# Patient Record
Sex: MALE | Race: OTHER | Employment: FULL TIME | URBAN - METROPOLITAN AREA
[De-identification: names, ages, dates, MRNs, and addresses within clinical notes are randomized per-mention and may not be internally consistent; named-entity substitution may affect disease eponyms.]

---

## 2017-07-10 ENCOUNTER — TELEPHONE (OUTPATIENT)
Dept: INTERNAL MEDICINE CLINIC | Facility: CLINIC | Age: 37
End: 2017-07-10

## 2017-07-11 NOTE — TELEPHONE ENCOUNTER
Actions Requested: appt for eval scheduled 7/12/17  Problem: complete body joint pain  Onset and Timing: > 6- 8 weeks  Associated Symptoms: profuse arthralgia, no swelling, afebrile currently did have fever yesterday for 2-3 hours.   TMax 101  Aggravating b

## 2017-07-12 ENCOUNTER — OFFICE VISIT (OUTPATIENT)
Dept: FAMILY MEDICINE CLINIC | Facility: CLINIC | Age: 37
End: 2017-07-12

## 2017-07-12 VITALS
DIASTOLIC BLOOD PRESSURE: 91 MMHG | TEMPERATURE: 98 F | BODY MASS INDEX: 38.36 KG/M2 | SYSTOLIC BLOOD PRESSURE: 135 MMHG | HEART RATE: 79 BPM | HEIGHT: 69 IN | WEIGHT: 259 LBS

## 2017-07-12 DIAGNOSIS — M25.60 JOINT STIFFNESS: ICD-10-CM

## 2017-07-12 DIAGNOSIS — R21 MALAR RASH: ICD-10-CM

## 2017-07-12 DIAGNOSIS — M25.50 POLYARTHRALGIA: Primary | ICD-10-CM

## 2017-07-12 PROCEDURE — 99212 OFFICE O/P EST SF 10 MIN: CPT | Performed by: FAMILY MEDICINE

## 2017-07-12 PROCEDURE — 99214 OFFICE O/P EST MOD 30 MIN: CPT | Performed by: FAMILY MEDICINE

## 2017-07-12 NOTE — PROGRESS NOTES
HPI:    Patient ID: Dionna Cortes is a 40year old male. HPI     Patient here with complains of having generalized joint pains including his shoulders and knees as well as elbows. He has noticed this in the last few months.     He denies any new medicat spanning across his cheeks and slightly over his nose. Psychiatric: He has a normal mood and affect. His behavior is normal.              ASSESSMENT/PLAN:   Polyarthralgia  (primary encounter diagnosis)  Joint stiffness  Malar rash     1.  Polyarthralgia

## 2017-07-19 ENCOUNTER — TELEPHONE (OUTPATIENT)
Dept: FAMILY MEDICINE CLINIC | Facility: CLINIC | Age: 37
End: 2017-07-19

## 2017-07-20 NOTE — TELEPHONE ENCOUNTER
Labs received  Blood sugar elevated  Also liver enzymes elevated and CRP elevated suggesting inflammation  Other labs ok    Need other liver tests  Will order liver panel, hepatitis panel.  Also will order HbA1C    Pls call patient and he gets labs done out

## 2017-07-25 NOTE — TELEPHONE ENCOUNTER
Patient informed of results and recommendations, as per Dr. Nydia Moulton result note. Patient voiced understanding and agrees with recommendations.        Pt states he lives about 5 minutes from [place] so he would just like to  the lab orders tomorrow, to take

## 2017-08-01 NOTE — TELEPHONE ENCOUNTER
LMTCB-please find out if patient picked up his order at [facility].

## 2017-11-17 ENCOUNTER — TELEPHONE (OUTPATIENT)
Dept: FAMILY MEDICINE CLINIC | Facility: CLINIC | Age: 37
End: 2017-11-17

## 2017-11-17 RX ORDER — INDOMETHACIN 50 MG/1
CAPSULE ORAL
Qty: 60 CAPSULE | Refills: 0 | Status: SHIPPED | OUTPATIENT
Start: 2017-11-17 | End: 2017-12-20

## 2017-11-17 NOTE — TELEPHONE ENCOUNTER
Refill Protocol Appointment Criteria Indomethacin Rx refilled as previously prescribed per protocol.   · Appointment scheduled in the past 6 months or in the next 3 months  Recent Outpatient Visits            4 months ago [address],

## 2017-12-20 RX ORDER — INDOMETHACIN 50 MG/1
CAPSULE ORAL
Qty: 60 CAPSULE | Refills: 0 | Status: SHIPPED | OUTPATIENT
Start: 2017-12-20 | End: 2019-03-06 | Stop reason: ALTCHOICE

## 2017-12-20 NOTE — TELEPHONE ENCOUNTER
Pt is leaving for [facility] tomorrow. States would like to have indomethacin on hand in case he has a gout flare up. Please advise.  Rx pended    Please respond to pool: PEDRO LUIS Rodriguez 62 LPN/CMA

## 2017-12-20 NOTE — TELEPHONE ENCOUNTER
[facility]    [place], [place]   Current Outpatient Prescriptions:  indomethacin 50 MG Oral Cap TAKE 1 CAPSULE (50 MG TOTAL) BY MOUTH 3 (THREE) TIMES DAILY WITH MEALS.  Disp: 60 capsule Rfl: 0

## 2019-02-20 ENCOUNTER — OFFICE VISIT (OUTPATIENT)
Dept: FAMILY MEDICINE CLINIC | Facility: CLINIC | Age: 39
End: 2019-02-20
Payer: COMMERCIAL

## 2019-02-20 VITALS
SYSTOLIC BLOOD PRESSURE: 137 MMHG | DIASTOLIC BLOOD PRESSURE: 80 MMHG | TEMPERATURE: 98 F | HEART RATE: 85 BPM | WEIGHT: 243 LBS | BODY MASS INDEX: 35.99 KG/M2 | HEIGHT: 69 IN

## 2019-02-20 DIAGNOSIS — Z00.00 WELL ADULT EXAM: ICD-10-CM

## 2019-02-20 DIAGNOSIS — R74.8 ELEVATED LIVER ENZYMES: ICD-10-CM

## 2019-02-20 DIAGNOSIS — E66.9 OBESITY (BMI 30-39.9): ICD-10-CM

## 2019-02-20 DIAGNOSIS — E79.0 HYPERURICEMIA: ICD-10-CM

## 2019-02-20 DIAGNOSIS — R73.9 HYPERGLYCEMIA: ICD-10-CM

## 2019-02-20 DIAGNOSIS — B37.42 CANDIDAL BALANITIS: Primary | ICD-10-CM

## 2019-02-20 PROCEDURE — 99214 OFFICE O/P EST MOD 30 MIN: CPT | Performed by: FAMILY MEDICINE

## 2019-02-20 PROCEDURE — G0463 HOSPITAL OUTPT CLINIC VISIT: HCPCS | Performed by: FAMILY MEDICINE

## 2019-02-20 RX ORDER — NYSTATIN 100000 U/G
1 CREAM TOPICAL 3 TIMES DAILY
Qty: 1 TUBE | Refills: 1 | Status: SHIPPED | OUTPATIENT
Start: 2019-02-20 | End: 2019-03-06

## 2019-02-20 NOTE — PROGRESS NOTES
HPI:    Patient ID: Sindy Naomi is a 45year old male. HPI  Patient presents with: Other: would like to be tested for STDS c/o of having itchiness on his penis    Patient c/o itching and redness over foreskin and penis shaft  No sores.      and (BMI 30-39.9)    - HEMOGLOBIN A1C; Future    3. Hyperuricemia    - URIC ACID, SERUM; Future    4. Well adult exam  rtc for physical  - BASIC METABOLIC PANEL (8); Future  - CBC WITH DIFFERENTIAL WITH PLATELET; Future  - LIPID PANEL;  Future  - TSH W REFLEX T

## 2019-03-06 ENCOUNTER — OFFICE VISIT (OUTPATIENT)
Dept: FAMILY MEDICINE CLINIC | Facility: CLINIC | Age: 39
End: 2019-03-06
Payer: COMMERCIAL

## 2019-03-06 VITALS
HEIGHT: 69 IN | SYSTOLIC BLOOD PRESSURE: 137 MMHG | DIASTOLIC BLOOD PRESSURE: 95 MMHG | HEART RATE: 76 BPM | BODY MASS INDEX: 36.14 KG/M2 | WEIGHT: 244 LBS | TEMPERATURE: 98 F

## 2019-03-06 DIAGNOSIS — E79.0 HYPERURICEMIA: ICD-10-CM

## 2019-03-06 DIAGNOSIS — R74.8 ELEVATED LIVER ENZYMES: ICD-10-CM

## 2019-03-06 DIAGNOSIS — R21 FACIAL RASH: ICD-10-CM

## 2019-03-06 DIAGNOSIS — M25.562 ACUTE PAIN OF LEFT KNEE: ICD-10-CM

## 2019-03-06 DIAGNOSIS — D22.9 MULTIPLE NEVI: ICD-10-CM

## 2019-03-06 DIAGNOSIS — Z00.00 WELL ADULT EXAM: Primary | ICD-10-CM

## 2019-03-06 DIAGNOSIS — R03.0 ELEVATED BLOOD PRESSURE READING: ICD-10-CM

## 2019-03-06 DIAGNOSIS — M25.512 ACUTE PAIN OF LEFT SHOULDER: ICD-10-CM

## 2019-03-06 PROBLEM — M25.50 POLYARTHRALGIA: Status: RESOLVED | Noted: 2017-07-12 | Resolved: 2019-03-06

## 2019-03-06 PROCEDURE — 99395 PREV VISIT EST AGE 18-39: CPT | Performed by: FAMILY MEDICINE

## 2019-03-06 RX ORDER — METRONIDAZOLE 10 MG/G
1 GEL TOPICAL 2 TIMES DAILY
Qty: 60 G | Refills: 0 | Status: SHIPPED | OUTPATIENT
Start: 2019-03-06 | End: 2019-07-04

## 2019-03-06 NOTE — PROGRESS NOTES
HPI:    Patient ID: Devin Conklin is a 45year old male. 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   Last tdap 2013    Will be getting labs done  Has order surgical history.   Social History    Socioeconomic History      Marital status: Single      Spouse name: Not on file      Number of children: Not on file      Years of education: Not on file      Highest education level: Not on file    Occupational History file    Family History   Problem Relation Age of Onset   • Hypertension Maternal Grandmother    • Heart Disorder Paternal Grandmother         Cardiomyopathy   • Other (Other) Other         lupus, mat aunt       Immunization History   Administered Date(s) A thought content normal.     Declined gu exam           ASSESSMENT/PLAN:   Well adult exam  (primary encounter diagnosis)  Elevated liver enzymes  Hyperuricemia  Acute pain of left knee  Acute pain of left shoulder  Facial rash  Elevated blood pressure read

## 2019-03-13 ENCOUNTER — OFFICE VISIT (OUTPATIENT)
Dept: DERMATOLOGY CLINIC | Facility: CLINIC | Age: 39
End: 2019-03-13
Payer: COMMERCIAL

## 2019-03-13 DIAGNOSIS — D22.9 MULTIPLE NEVI: Primary | ICD-10-CM

## 2019-03-13 DIAGNOSIS — D23.4 BENIGN NEOPLASM OF SCALP AND SKIN OF NECK: ICD-10-CM

## 2019-03-13 DIAGNOSIS — D23.5 BENIGN NEOPLASM OF SKIN OF TRUNK, EXCEPT SCROTUM: ICD-10-CM

## 2019-03-13 DIAGNOSIS — L71.9 ROSACEA: ICD-10-CM

## 2019-03-13 DIAGNOSIS — D23.60 BENIGN NEOPLASM OF SKIN OF UPPER LIMB, INCLUDING SHOULDER, UNSPECIFIED LATERALITY: ICD-10-CM

## 2019-03-13 DIAGNOSIS — D23.30 BENIGN NEOPLASM OF SKIN OF FACE: ICD-10-CM

## 2019-03-13 PROCEDURE — 99212 OFFICE O/P EST SF 10 MIN: CPT | Performed by: DERMATOLOGY

## 2019-03-13 PROCEDURE — 99202 OFFICE O/P NEW SF 15 MIN: CPT | Performed by: DERMATOLOGY

## 2019-03-24 NOTE — PROGRESS NOTES
Tonie Fontana is a 45year old male. HPI:     CC:  Patient presents with:  Rosacea: New pt presenting with rosacea on bilateral cheeks. Pt states currently using metrogel 1-2 times daily.  Pt notes started one week prior and has not seen any improvement ye Beer, 2 beers weekly, last drink last night      Drug use: No      Sexual activity: Not on file    Lifestyle      Physical activity:        Days per week: Not on file        Minutes per session: Not on file      Stress: Not on file    Relationships      So recently been develping. Pt denies personal and family hx of skin cancer. Patient presents with concerns above. Patient has been in their usual state of health. History, medications, allergies reviewed as noted.       ROS:  Denies any other syste the chest arms reassurance. No suspicious lesions. Erythema over the central face and nose few papules. Using MetroGel for the past week mostly once daily. continue metronidazole. .Rosacea. Meds in grid. Skin care instructions reviewed.   Pathophys

## 2020-01-06 ENCOUNTER — OFFICE VISIT (OUTPATIENT)
Dept: FAMILY MEDICINE CLINIC | Facility: CLINIC | Age: 40
End: 2020-01-06
Payer: COMMERCIAL

## 2020-01-06 VITALS
HEART RATE: 69 BPM | WEIGHT: 231 LBS | BODY MASS INDEX: 34.21 KG/M2 | OXYGEN SATURATION: 99 % | SYSTOLIC BLOOD PRESSURE: 149 MMHG | DIASTOLIC BLOOD PRESSURE: 93 MMHG | TEMPERATURE: 99 F | HEIGHT: 69 IN

## 2020-01-06 DIAGNOSIS — R06.2 WHEEZING: ICD-10-CM

## 2020-01-06 DIAGNOSIS — R05.9 COUGH: Primary | ICD-10-CM

## 2020-01-06 DIAGNOSIS — R53.83 FATIGUE, UNSPECIFIED TYPE: ICD-10-CM

## 2020-01-06 DIAGNOSIS — R03.0 ELEVATED BLOOD PRESSURE READING: ICD-10-CM

## 2020-01-06 PROCEDURE — 99212 OFFICE O/P EST SF 10 MIN: CPT | Performed by: FAMILY MEDICINE

## 2020-01-06 PROCEDURE — 99214 OFFICE O/P EST MOD 30 MIN: CPT | Performed by: FAMILY MEDICINE

## 2020-01-06 RX ORDER — AZITHROMYCIN 250 MG/1
TABLET, FILM COATED ORAL
Qty: 6 TABLET | Refills: 0 | Status: SHIPPED | OUTPATIENT
Start: 2020-01-06 | End: 2020-01-27 | Stop reason: ALTCHOICE

## 2020-01-06 NOTE — PROGRESS NOTES
HPI:    Patient ID: Jonah Butterfield is a 44year old male.     HPI  Patient presents with:  ER F/U: went to [facility] last Sunday had the influenza B still coughing, lost of appetite and still feeling kind of weak     Went to ER at [facility] 12/29 and wa (CPT=71046); Future  - azithromycin 250 MG Oral Tab; Take two tablets by mouth today, then one daily. Dispense: 6 tablet; Refill: 0    3. Fatigue, unspecified type  Push fluids    4.  Elevated blood pressure reading  Low salt diet  Follow up 2 weeks      N

## 2020-01-08 ENCOUNTER — HOSPITAL ENCOUNTER (OUTPATIENT)
Dept: GENERAL RADIOLOGY | Age: 40
Discharge: HOME | End: 2020-01-08
Attending: FAMILY MEDICINE
Payer: COMMERCIAL

## 2020-01-08 DIAGNOSIS — R05.9 COUGH: ICD-10-CM

## 2020-01-08 DIAGNOSIS — R06.2 WHEEZING: ICD-10-CM

## 2020-01-08 PROCEDURE — 71046 X-RAY EXAM CHEST 2 VIEWS: CPT | Performed by: FAMILY MEDICINE

## 2020-01-27 ENCOUNTER — OFFICE VISIT (OUTPATIENT)
Dept: FAMILY MEDICINE CLINIC | Facility: CLINIC | Age: 40
End: 2020-01-27
Payer: COMMERCIAL

## 2020-01-27 VITALS
HEART RATE: 73 BPM | DIASTOLIC BLOOD PRESSURE: 95 MMHG | WEIGHT: 236 LBS | TEMPERATURE: 98 F | BODY MASS INDEX: 34.96 KG/M2 | HEIGHT: 69 IN | SYSTOLIC BLOOD PRESSURE: 144 MMHG

## 2020-01-27 DIAGNOSIS — R03.0 ELEVATED BLOOD PRESSURE READING: ICD-10-CM

## 2020-01-27 DIAGNOSIS — R05.9 COUGH: Primary | ICD-10-CM

## 2020-01-27 PROCEDURE — 99213 OFFICE O/P EST LOW 20 MIN: CPT | Performed by: FAMILY MEDICINE

## 2020-01-27 PROCEDURE — 99212 OFFICE O/P EST SF 10 MIN: CPT | Performed by: FAMILY MEDICINE

## 2020-01-27 NOTE — PROGRESS NOTES
HPI:    Patient ID: Lynette Subramanian is a 44year old male. HPI  Patient presents with: Follow - Up: on BP and cough feeling much better     Patient here for follow up  Had flu recently    cxr done recently.  Normal.    Coughing resolved    States he had a

## 2020-01-27 NOTE — PATIENT INSTRUCTIONS
Low-Salt Diet  This diet removes foods that are high in salt. It also limits the amount of salt you use when cooking. It is most often used for people with high blood pressure, edema (fluid retention), and kidney, liver, or heart disease.   Table salt con Avoid: Flavored coffees, electrolyte replacement drinks, sports drinks  Meats  Ok: All fresh meat, fish, poultry, low-salt tuna, eggs, egg substitute  Avoid: Smoked, pickled, brine-cured, or salted meats and fish.  This includes mas, chipped beef, corned

## 2020-07-01 ENCOUNTER — NURSE TRIAGE (OUTPATIENT)
Dept: FAMILY MEDICINE CLINIC | Facility: CLINIC | Age: 40
End: 2020-07-01

## 2020-07-01 NOTE — TELEPHONE ENCOUNTER
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 States the hospital advised his employee to inform anyone he has been in contact with to be tested. Had last contact with pt today. Pt denies symptoms.  Infor

## 2020-07-30 ENCOUNTER — NURSE TRIAGE (OUTPATIENT)
Dept: FAMILY MEDICINE CLINIC | Facility: CLINIC | Age: 40
End: 2020-07-30

## 2020-07-30 ENCOUNTER — OFFICE VISIT (OUTPATIENT)
Dept: FAMILY MEDICINE CLINIC | Facility: CLINIC | Age: 40
End: 2020-07-30
Payer: COMMERCIAL

## 2020-07-30 VITALS
HEIGHT: 69 IN | DIASTOLIC BLOOD PRESSURE: 95 MMHG | TEMPERATURE: 98 F | HEART RATE: 95 BPM | BODY MASS INDEX: 33.47 KG/M2 | WEIGHT: 226 LBS | SYSTOLIC BLOOD PRESSURE: 161 MMHG

## 2020-07-30 DIAGNOSIS — M10.9 ACUTE GOUT OF RIGHT ANKLE, UNSPECIFIED CAUSE: Primary | ICD-10-CM

## 2020-07-30 DIAGNOSIS — I10 ESSENTIAL HYPERTENSION: ICD-10-CM

## 2020-07-30 PROCEDURE — 99214 OFFICE O/P EST MOD 30 MIN: CPT | Performed by: STUDENT IN AN ORGANIZED HEALTH CARE EDUCATION/TRAINING PROGRAM

## 2020-07-30 PROCEDURE — 99212 OFFICE O/P EST SF 10 MIN: CPT | Performed by: STUDENT IN AN ORGANIZED HEALTH CARE EDUCATION/TRAINING PROGRAM

## 2020-07-30 PROCEDURE — 3080F DIAST BP >= 90 MM HG: CPT | Performed by: STUDENT IN AN ORGANIZED HEALTH CARE EDUCATION/TRAINING PROGRAM

## 2020-07-30 PROCEDURE — 3008F BODY MASS INDEX DOCD: CPT | Performed by: STUDENT IN AN ORGANIZED HEALTH CARE EDUCATION/TRAINING PROGRAM

## 2020-07-30 PROCEDURE — 3077F SYST BP >= 140 MM HG: CPT | Performed by: STUDENT IN AN ORGANIZED HEALTH CARE EDUCATION/TRAINING PROGRAM

## 2020-07-30 RX ORDER — LOSARTAN POTASSIUM 50 MG/1
50 TABLET ORAL DAILY
Qty: 30 TABLET | Refills: 0 | Status: SHIPPED | OUTPATIENT
Start: 2020-07-30 | End: 2020-10-15

## 2020-07-30 RX ORDER — INDOMETHACIN 50 MG/1
50 CAPSULE ORAL
Qty: 30 CAPSULE | Refills: 0 | Status: SHIPPED | OUTPATIENT
Start: 2020-07-30 | End: 2021-01-04

## 2020-07-30 NOTE — PROGRESS NOTES
HPI:    Patient ID: Cleave Dakin is a 36year old male. HPI  Pt presenting with Right ankle pain. Reports onset of dull ankle pain 2 days ago that has now progressed.  He reports pain with any ankle movement or prolonged standings, but is able to walk c regular rhythm. Pulses: Normal pulses. Dorsalis pedis pulses are 2+ on the right side and 2+ on the left side. Posterior tibial pulses are 2+ on the right side and 2+ on the left side.       Heart sounds: Normal heart sounds, S1 normal low salt / DASH diet  - to check labs below  - after discussing risks and benefits, will trial Losartan daily  - encouraged to check blood pressure, avoid stressors as able  - discussed red flags for urgent evaluation  - to call with any questions  - COMP

## 2020-07-30 NOTE — TELEPHONE ENCOUNTER
Action Requested: Summary for Provider     []  Critical Lab, Recommendations Needed  [] Need Additional Advice  []   FYI    []   Need Orders  [] Need Medications Sent to Pharmacy  []  Other     SUMMARY: Patient calling with right ankle pain which started o

## 2020-08-19 ENCOUNTER — TELEPHONE (OUTPATIENT)
Dept: FAMILY MEDICINE CLINIC | Facility: CLINIC | Age: 40
End: 2020-08-19

## 2020-08-19 NOTE — TELEPHONE ENCOUNTER
LVM to ask pt if he got a chance to get his labs done at [address] [place] [address] prior his follow up visit with Dr Walter Rothman. 8/20/2020. Order is in the system.

## 2020-08-25 ENCOUNTER — TELEMEDICINE (OUTPATIENT)
Dept: FAMILY MEDICINE CLINIC | Facility: CLINIC | Age: 40
End: 2020-08-25
Payer: COMMERCIAL

## 2020-08-25 ENCOUNTER — NURSE TRIAGE (OUTPATIENT)
Dept: FAMILY MEDICINE CLINIC | Facility: CLINIC | Age: 40
End: 2020-08-25

## 2020-08-25 DIAGNOSIS — R11.11 VOMITING WITHOUT NAUSEA, INTRACTABILITY OF VOMITING NOT SPECIFIED, UNSPECIFIED VOMITING TYPE: Primary | ICD-10-CM

## 2020-08-25 PROCEDURE — 99212 OFFICE O/P EST SF 10 MIN: CPT | Performed by: FAMILY MEDICINE

## 2020-08-25 NOTE — PROGRESS NOTES
This visit is conducted using Telemedicine with live, interactive video and audio during this Coronavirus pandemic. Please note that the following visit was completed using two-way, real-time interactive audio and/or video communication.   This has been telehealth platform. The patient was made aware of where to find [facility] notice of privacy practices, telehealth consent form and other related consent forms and documents. which are located on the [facility] website.  The patient verbally agreed to telehealth co in acute distress, comfortably seated in own residence  Patient was speaking in complete sentences, no increased work of breathing and very coherent and alert on the phone. Throat/Neck: normal sound to voice. Respiratory:  non-labored.  no increased work

## 2020-08-25 NOTE — TELEPHONE ENCOUNTER
Action Requested: Summary for Provider     []  Critical Lab, Recommendations Needed  [] Need Additional Advice  []   FYI    []   Need Orders  [] Need Medications Sent to Pharmacy  []  Other     SUMMARY: vomited once at 130am, and at 630am this morning.   Hannah Cardoso

## 2020-10-12 ENCOUNTER — HOSPITAL ENCOUNTER (EMERGENCY)
Facility: HOSPITAL | Age: 40
Discharge: HOME | End: 2020-10-12
Payer: COMMERCIAL

## 2020-10-12 ENCOUNTER — APPOINTMENT (OUTPATIENT)
Dept: GENERAL RADIOLOGY | Facility: HOSPITAL | Age: 40
End: 2020-10-12
Payer: COMMERCIAL

## 2020-10-12 ENCOUNTER — NURSE TRIAGE (OUTPATIENT)
Dept: FAMILY MEDICINE CLINIC | Facility: CLINIC | Age: 40
End: 2020-10-12

## 2020-10-12 VITALS
SYSTOLIC BLOOD PRESSURE: 136 MMHG | WEIGHT: 235 LBS | OXYGEN SATURATION: 99 % | HEART RATE: 68 BPM | DIASTOLIC BLOOD PRESSURE: 84 MMHG | RESPIRATION RATE: 14 BRPM | HEIGHT: 70 IN | TEMPERATURE: 98 F | BODY MASS INDEX: 33.64 KG/M2

## 2020-10-12 DIAGNOSIS — R07.89 CHEST PAIN, ATYPICAL: Primary | ICD-10-CM

## 2020-10-12 PROCEDURE — 93010 ELECTROCARDIOGRAM REPORT: CPT | Performed by: INTERNAL MEDICINE

## 2020-10-12 PROCEDURE — 71046 X-RAY EXAM CHEST 2 VIEWS: CPT

## 2020-10-12 PROCEDURE — 84484 ASSAY OF TROPONIN QUANT: CPT

## 2020-10-12 PROCEDURE — 85025 COMPLETE CBC W/AUTO DIFF WBC: CPT

## 2020-10-12 PROCEDURE — 36415 COLL VENOUS BLD VENIPUNCTURE: CPT

## 2020-10-12 PROCEDURE — 99284 EMERGENCY DEPT VISIT MOD MDM: CPT

## 2020-10-12 PROCEDURE — 85379 FIBRIN DEGRADATION QUANT: CPT | Performed by: EMERGENCY MEDICINE

## 2020-10-12 PROCEDURE — 80048 BASIC METABOLIC PNL TOTAL CA: CPT

## 2020-10-12 PROCEDURE — 93005 ELECTROCARDIOGRAM TRACING: CPT

## 2020-10-12 NOTE — TELEPHONE ENCOUNTER
Action Requested: Summary for Provider     []  Critical Lab, Recommendations Needed  [] Need Additional Advice  [x]   FYI    []   Need Orders  [] Need Medications Sent to Pharmacy  []  Other     SUMMARY: Patient advised ED now, reports chest pain that bega

## 2020-10-12 NOTE — ED PROVIDER NOTES
Patient Seen in: [facility] Emergency Department      History   Patient presents with:  Chest Pain Angina    Stated Complaint: CP    HPI    She presents to the emergency department complaining of chest pain.   He describes chest discomfort which is HENT:      Head: Normocephalic. Eyes:      Conjunctiva/sclera: Conjunctivae normal.   Neck:      Musculoskeletal: Normal range of motion and neck supple. Cardiovascular:      Rate and Rhythm: Normal rate and regular rhythm.       Heart sounds: No murm 98%, Normal,     Cardiac Monitor: Pulse Readings from Last 1 Encounters:  10/12/20 : 66  , sinus,      Radiology findings: Xr Chest Pa + Lat Chest (cpt=71046)    Result Date: 10/12/2020  CONCLUSION:  No acute cardiopulmonary process.     Dictated by (CST):

## 2020-10-12 NOTE — TELEPHONE ENCOUNTER
Spoke to patient; he went to ER and cardiac testing negative. Scheduled f/u appt Thursday with DR St.

## 2020-10-12 NOTE — ED NOTES
Assumed care to this pt who came in ambulatory with steady gait to room 34 from triage for complaints of left sided chest pain that started Saturday, denies N/V, denies SOB. Pain scale  at 4/10 at this time. Pt is A/O x 4, breathing is non labored.   Will

## 2020-10-15 ENCOUNTER — OFFICE VISIT (OUTPATIENT)
Dept: FAMILY MEDICINE CLINIC | Facility: CLINIC | Age: 40
End: 2020-10-15
Payer: COMMERCIAL

## 2020-10-15 VITALS
TEMPERATURE: 98 F | HEIGHT: 70 IN | HEART RATE: 82 BPM | WEIGHT: 235 LBS | BODY MASS INDEX: 33.64 KG/M2 | DIASTOLIC BLOOD PRESSURE: 95 MMHG | SYSTOLIC BLOOD PRESSURE: 146 MMHG

## 2020-10-15 DIAGNOSIS — I10 ESSENTIAL HYPERTENSION: ICD-10-CM

## 2020-10-15 DIAGNOSIS — E66.9 OBESITY (BMI 30.0-34.9): ICD-10-CM

## 2020-10-15 DIAGNOSIS — R07.89 ATYPICAL CHEST PAIN: Primary | ICD-10-CM

## 2020-10-15 PROCEDURE — 90686 IIV4 VACC NO PRSV 0.5 ML IM: CPT | Performed by: FAMILY MEDICINE

## 2020-10-15 PROCEDURE — 99214 OFFICE O/P EST MOD 30 MIN: CPT | Performed by: FAMILY MEDICINE

## 2020-10-15 PROCEDURE — 3008F BODY MASS INDEX DOCD: CPT | Performed by: FAMILY MEDICINE

## 2020-10-15 PROCEDURE — 3080F DIAST BP >= 90 MM HG: CPT | Performed by: FAMILY MEDICINE

## 2020-10-15 PROCEDURE — 99212 OFFICE O/P EST SF 10 MIN: CPT | Performed by: FAMILY MEDICINE

## 2020-10-15 PROCEDURE — 3077F SYST BP >= 140 MM HG: CPT | Performed by: FAMILY MEDICINE

## 2020-10-15 PROCEDURE — 90471 IMMUNIZATION ADMIN: CPT | Performed by: FAMILY MEDICINE

## 2020-10-15 RX ORDER — LOSARTAN POTASSIUM 50 MG/1
50 TABLET ORAL DAILY
Qty: 90 TABLET | Refills: 0 | Status: SHIPPED | OUTPATIENT
Start: 2020-10-15 | End: 2021-12-14

## 2020-10-15 NOTE — PATIENT INSTRUCTIONS
Low-Salt Diet  This diet removes foods that are high in salt. It also limits the amount of salt you use when cooking. It is most often used for people with high blood pressure, fluid retention (edema), and kidney, liver, or heart disease.    Table salt ha OK: Tea, coffee, fizzy (carbonated) drinks, juices   Avoid: Flavored coffees, electrolyte replacement drinks, sports drinks   Meats  OK:  All fresh meat, fish, poultry, low-salt tuna, eggs, egg substitute   Avoid: Smoked, pickled, brine-cured, or salted hemanth

## 2020-10-15 NOTE — PROGRESS NOTES
HPI:    Patient ID: Stephy Bernard is a 36year old male.     HPI  Patient presents with:  ER F/U: went to [address] [place] [address] on 10-12-20 feeling better     Patient seen in the emergency room for atypical chest pain on October 12    ER report:  She presents to the emergenc range of motion. General: No tenderness. Skin:     General: Skin is warm and dry. Capillary Refill: Capillary refill takes less than 2 seconds. Findings: No rash. Neurological:      General: No focal deficit present.       Mental Statu up           Heart Score:     HEART Score           Title    Criteria Score   Age: <45 Age Score: 0   History: Slightly Suspicious Hx Score: 0   EKG: Normal EKG Score: 0   HTN: Yes   Hypercholesterolemia: No   Atherosclerosis/PVD: No   DM: No   BMI>30kg/m2 Negative for decreased urine volume and difficulty urinating. Neurological: Negative for dizziness, tremors, seizures, weakness, light-headedness, numbness and headaches.        BP (!) 146/95   Pulse 82   Temp 98.3 °F (36.8 °C) (Temporal)   Ht 5' 10\" (1. weight. Discussed good dietary and eating habits as well as increasing vegetable and fruit intake. Recommending avoiding foods high in fat content. Recommend exercising at least 30-40 minutes 5-6 days a week. Avoid skipping meals.   Making healthy choic

## 2021-01-04 DIAGNOSIS — M10.9 ACUTE GOUT OF RIGHT ANKLE, UNSPECIFIED CAUSE: ICD-10-CM

## 2021-01-04 RX ORDER — INDOMETHACIN 50 MG/1
50 CAPSULE ORAL
Qty: 30 CAPSULE | Refills: 0 | Status: SHIPPED | OUTPATIENT
Start: 2021-01-04 | End: 2021-01-14

## 2021-01-04 NOTE — TELEPHONE ENCOUNTER
Patient is having a gout flare up and is out of medication, requesting refill on indomethacin 50 MG Oral Cap    please advise

## 2021-02-05 ENCOUNTER — TELEPHONE (OUTPATIENT)
Dept: FAMILY MEDICINE CLINIC | Facility: CLINIC | Age: 41
End: 2021-02-05

## 2021-02-05 RX ORDER — NYSTATIN 100000 U/G
1 CREAM TOPICAL 3 TIMES DAILY
Qty: 1 TUBE | Refills: 1 | Status: CANCELLED | OUTPATIENT
Start: 2021-02-05

## 2021-02-05 NOTE — TELEPHONE ENCOUNTER
pts wife requesting to get a new Rx for Nystatin medicaton cream for fungus on feet. Please send Rx to [facility] [place].

## 2021-02-05 NOTE — TELEPHONE ENCOUNTER
Please advise if medication can be prescribed. Med pended    Nystatin cream last prescribed 02/02/2019     Called patient to find out symptoms he is having.  Left message to call back    Wife not on PRIYA

## 2021-03-08 ENCOUNTER — HOSPITAL ENCOUNTER (OUTPATIENT)
Age: 41
Discharge: HOME | End: 2021-03-08
Payer: COMMERCIAL

## 2021-03-08 ENCOUNTER — APPOINTMENT (OUTPATIENT)
Dept: CT IMAGING | Age: 41
End: 2021-03-08
Attending: NURSE PRACTITIONER
Payer: COMMERCIAL

## 2021-03-08 VITALS
HEART RATE: 99 BPM | WEIGHT: 230 LBS | RESPIRATION RATE: 20 BRPM | OXYGEN SATURATION: 99 % | HEIGHT: 70 IN | TEMPERATURE: 98 F | BODY MASS INDEX: 32.93 KG/M2 | SYSTOLIC BLOOD PRESSURE: 167 MMHG | DIASTOLIC BLOOD PRESSURE: 91 MMHG

## 2021-03-08 DIAGNOSIS — S09.90XA INJURY OF HEAD, INITIAL ENCOUNTER: Primary | ICD-10-CM

## 2021-03-08 PROCEDURE — 70450 CT HEAD/BRAIN W/O DYE: CPT | Performed by: NURSE PRACTITIONER

## 2021-03-08 PROCEDURE — 99203 OFFICE O/P NEW LOW 30 MIN: CPT | Performed by: NURSE PRACTITIONER

## 2021-03-08 RX ORDER — ERYTHROMYCIN 5 MG/G
1 OINTMENT OPHTHALMIC EVERY 6 HOURS
Qty: 1 G | Refills: 0 | Status: SHIPPED | OUTPATIENT
Start: 2021-03-08 | End: 2021-03-15

## 2021-03-08 RX ORDER — TETRACAINE HYDROCHLORIDE 5 MG/ML
1 SOLUTION OPHTHALMIC ONCE
Status: COMPLETED | OUTPATIENT
Start: 2021-03-08 | End: 2021-03-08

## 2021-03-08 NOTE — ED INITIAL ASSESSMENT (HPI)
Pt stated he slipped and fell last night. Pt hit the back of his head on concrete. No loc. Pt took ibuprofen for pain with no relief. No dizziness. No nausea/vomiting. Left eye pain.   Pt states he had contacts in all day yesterday that may have irrit

## 2021-03-09 NOTE — ED PROVIDER NOTES
Patient Seen in: [facility]      History   Patient presents with:  Head Injury  Eye Pain    Stated Complaint: Pain on back of head    HPI/Subjective:   HPI    This well-appearing 55-year-old who presents with a chief complaint of headache and 20/20, Corrected  Left Eye Chart Acuity: 20/25, Corrected    Physical Exam  Vitals and nursing note reviewed. Constitutional:       General: He is awake. He is not in acute distress. Appearance: Normal appearance.  He is not ill-appearing, toxic-appea Gait is intact. Psychiatric:         Mood and Affect: Mood normal.         Behavior: Behavior normal. Behavior is cooperative. Thought Content:  Thought content normal.         Judgment: Judgment normal.       ED Course   Labs Reviewed - No data t

## 2021-05-25 ENCOUNTER — HOSPITAL ENCOUNTER (OUTPATIENT)
Dept: GENERAL RADIOLOGY | Age: 41
Discharge: HOME | End: 2021-05-25
Attending: PHYSICIAN ASSISTANT
Payer: COMMERCIAL

## 2021-05-25 ENCOUNTER — OFFICE VISIT (OUTPATIENT)
Dept: FAMILY MEDICINE CLINIC | Facility: CLINIC | Age: 41
End: 2021-05-25
Payer: COMMERCIAL

## 2021-05-25 ENCOUNTER — NURSE TRIAGE (OUTPATIENT)
Dept: FAMILY MEDICINE CLINIC | Facility: CLINIC | Age: 41
End: 2021-05-25

## 2021-05-25 VITALS
HEIGHT: 70 IN | HEART RATE: 87 BPM | WEIGHT: 246 LBS | BODY MASS INDEX: 35.22 KG/M2 | SYSTOLIC BLOOD PRESSURE: 156 MMHG | DIASTOLIC BLOOD PRESSURE: 98 MMHG | RESPIRATION RATE: 18 BRPM | OXYGEN SATURATION: 98 %

## 2021-05-25 DIAGNOSIS — M79.89 SWOLLEN FINGER: Primary | ICD-10-CM

## 2021-05-25 DIAGNOSIS — M79.89 SWOLLEN FINGER: ICD-10-CM

## 2021-05-25 PROCEDURE — 3080F DIAST BP >= 90 MM HG: CPT | Performed by: PHYSICIAN ASSISTANT

## 2021-05-25 PROCEDURE — 73130 X-RAY EXAM OF HAND: CPT | Performed by: PHYSICIAN ASSISTANT

## 2021-05-25 PROCEDURE — 3077F SYST BP >= 140 MM HG: CPT | Performed by: PHYSICIAN ASSISTANT

## 2021-05-25 PROCEDURE — 99213 OFFICE O/P EST LOW 20 MIN: CPT | Performed by: PHYSICIAN ASSISTANT

## 2021-05-25 PROCEDURE — 3008F BODY MASS INDEX DOCD: CPT | Performed by: PHYSICIAN ASSISTANT

## 2021-05-25 RX ORDER — INDOMETHACIN 50 MG/1
50 CAPSULE ORAL
Qty: 30 CAPSULE | Refills: 0 | Status: SHIPPED | OUTPATIENT
Start: 2021-05-25 | End: 2021-06-12

## 2021-05-25 NOTE — PROGRESS NOTES
HPI:    Patient ID: Stephy Bernard is a 36year old male. Patient presents with swollen index finger on the right hand for the past 2 days. No fever/chills. No numbness or tingling noted. No injury or trauma noted. Has not noted any draining.  Does have a oriented to person, place, and time. Sensory: No sensory deficit. ASSESSMENT/PLAN:   1.  Swollen finger  -After discussion with patient, advised the following:  -Most likely gout.   -Started indomethacin  -Educated pt on how to take the

## 2021-05-25 NOTE — TELEPHONE ENCOUNTER
Action Requested: Summary for Provider     []  Critical Lab, Recommendations Needed  [] Need Additional Advice  []   FYI    []   Need Orders  [] Need Medications Sent to Pharmacy  []  Other     SUMMARY: OV within 3 days per protocol.    Future Appointments

## 2021-05-27 ENCOUNTER — TELEPHONE (OUTPATIENT)
Dept: FAMILY MEDICINE CLINIC | Facility: CLINIC | Age: 41
End: 2021-05-27

## 2021-05-27 NOTE — TELEPHONE ENCOUNTER
Called pt to inform that letter is created. Oleg Wing PA-C there is an error in the letter 5/30 is Sunday; 5/31 is Monday. Pt wants to return to work 5/31 Monday. Oleg Wing can you please addend the letter? Thank you.  Pt will pick it up today before 6pm.

## 2021-05-27 NOTE — TELEPHONE ENCOUNTER
Pt said he was seen by Jody De La Cruz and was told if he needed another day off work to call. Pt said he is still not feeling well and would like to take today off work. Pt would like to  letter today. Please call when ready.  Please advise

## 2021-06-12 ENCOUNTER — MOBILE ENCOUNTER (OUTPATIENT)
Dept: FAMILY MEDICINE CLINIC | Facility: CLINIC | Age: 41
End: 2021-06-12

## 2021-06-12 RX ORDER — INDOMETHACIN 50 MG/1
50 CAPSULE ORAL
Qty: 30 CAPSULE | Refills: 0 | Status: SHIPPED | OUTPATIENT
Start: 2021-06-12 | End: 2021-07-05

## 2021-06-12 NOTE — PROGRESS NOTES
Physician on call note. Patient states that he was diagnosed with gout in his finger and ran out of indomethacin. Requesting refill.   His pain has improved significantly last week however upon his return to work he hit his finger and the pain was reaggrav

## 2021-07-05 ENCOUNTER — TELEPHONE (OUTPATIENT)
Dept: FAMILY MEDICINE CLINIC | Facility: CLINIC | Age: 41
End: 2021-07-05

## 2021-07-05 RX ORDER — INDOMETHACIN 50 MG/1
50 CAPSULE ORAL
Qty: 30 CAPSULE | Refills: 0 | Status: SHIPPED | OUTPATIENT
Start: 2021-07-05 | End: 2021-07-20

## 2021-07-05 NOTE — TELEPHONE ENCOUNTER
Paged received from Mr Sanjay Naylor, request refills on Indomethacin for big toe pains with Hx gout. He will make appt with PCP next week.

## 2021-07-06 NOTE — TELEPHONE ENCOUNTER
Noted.      Future Appointments   Date Time Provider Kolby Thompson   7/6/2021  3:00 PM Wm Romo MD [facility]

## 2021-07-20 ENCOUNTER — OFFICE VISIT (OUTPATIENT)
Dept: FAMILY MEDICINE CLINIC | Facility: CLINIC | Age: 41
End: 2021-07-20
Payer: COMMERCIAL

## 2021-07-20 VITALS
BODY MASS INDEX: 34.07 KG/M2 | DIASTOLIC BLOOD PRESSURE: 95 MMHG | HEIGHT: 70 IN | RESPIRATION RATE: 20 BRPM | HEART RATE: 85 BPM | WEIGHT: 238 LBS | SYSTOLIC BLOOD PRESSURE: 166 MMHG | TEMPERATURE: 98 F

## 2021-07-20 DIAGNOSIS — M10.9 GOUT, UNSPECIFIED CAUSE, UNSPECIFIED CHRONICITY, UNSPECIFIED SITE: ICD-10-CM

## 2021-07-20 PROCEDURE — 3077F SYST BP >= 140 MM HG: CPT | Performed by: FAMILY MEDICINE

## 2021-07-20 PROCEDURE — 3008F BODY MASS INDEX DOCD: CPT | Performed by: FAMILY MEDICINE

## 2021-07-20 PROCEDURE — 3080F DIAST BP >= 90 MM HG: CPT | Performed by: FAMILY MEDICINE

## 2021-07-20 PROCEDURE — 99213 OFFICE O/P EST LOW 20 MIN: CPT | Performed by: FAMILY MEDICINE

## 2021-07-20 RX ORDER — INDOMETHACIN 50 MG/1
50 CAPSULE ORAL
Qty: 30 CAPSULE | Refills: 0 | Status: SHIPPED | OUTPATIENT
Start: 2021-07-20 | End: 2021-09-25

## 2021-07-20 NOTE — PROGRESS NOTES
HPI/Subjective:   Patient ID: Cheyanne Buck is a 39year old male. Pt presents with flare up of his gout over the left ankle area. Pt tries to eat well. Pt use to drink water well but not as much now.  Has had more flares this past year due to his not dr

## 2021-07-21 LAB
BUN: 11 MG/DL (ref 7–25)
CALCIUM: 10.6 MG/DL (ref 8.6–10.3)
CARBON DIOXIDE: 28 MMOL/L (ref 20–32)
CHLORIDE: 98 MMOL/L (ref 98–110)
CREATININE: 0.94 MG/DL (ref 0.6–1.35)
EGFR IF AFRICN AM: 116 ML/MIN/1.73M2
EGFR IF NONAFRICN AM: 100 ML/MIN/1.73M2
GLUCOSE: 111 MG/DL (ref 65–99)
POTASSIUM: 4.8 MMOL/L (ref 3.5–5.3)
SODIUM: 138 MMOL/L (ref 135–146)
URIC ACID: 10.5 MG/DL (ref 4–8)

## 2021-09-24 ENCOUNTER — NURSE TRIAGE (OUTPATIENT)
Dept: FAMILY MEDICINE CLINIC | Facility: CLINIC | Age: 41
End: 2021-09-24

## 2021-09-24 RX ORDER — ALLOPURINOL 100 MG/1
100 TABLET ORAL DAILY
Qty: 90 TABLET | Refills: 0 | OUTPATIENT
Start: 2021-09-24

## 2021-09-24 RX ORDER — INDOMETHACIN 50 MG/1
50 CAPSULE ORAL
Qty: 30 CAPSULE | Refills: 0 | OUTPATIENT
Start: 2021-09-24

## 2021-09-24 RX ORDER — INDOMETHACIN 50 MG/1
CAPSULE ORAL
Qty: 30 CAPSULE | Refills: 0 | OUTPATIENT
Start: 2021-09-24

## 2021-09-24 NOTE — TELEPHONE ENCOUNTER
Action Requested: Summary for Provider     []  Critical Lab, Recommendations Needed  [] Need Additional Advice  []   FYI    []   Need Orders  [] Need Medications Sent to Pharmacy  []  Other     SUMMARY:pt stated having a flare up , out of both med, right f

## 2021-09-24 NOTE — TELEPHONE ENCOUNTER
Per patient he needs a refill on his rx med indomethacin  And allopurinol and per patient he is out of med and he needs it for his gout.

## 2021-09-25 RX ORDER — ALLOPURINOL 100 MG/1
100 TABLET ORAL DAILY
Qty: 90 TABLET | Refills: 0 | Status: SHIPPED | OUTPATIENT
Start: 2021-09-25

## 2021-09-25 RX ORDER — INDOMETHACIN 50 MG/1
50 CAPSULE ORAL
Qty: 30 CAPSULE | Refills: 0 | Status: SHIPPED | OUTPATIENT
Start: 2021-09-25 | End: 2021-10-06

## 2021-09-27 RX ORDER — INDOMETHACIN 50 MG/1
CAPSULE ORAL
Qty: 30 CAPSULE | Refills: 0 | OUTPATIENT
Start: 2021-09-27

## 2021-10-06 RX ORDER — INDOMETHACIN 50 MG/1
50 CAPSULE ORAL
Qty: 30 CAPSULE | Refills: 0 | Status: SHIPPED | OUTPATIENT
Start: 2021-10-06 | End: 2021-11-20

## 2021-10-06 NOTE — TELEPHONE ENCOUNTER
Patient stated that was taking the indomethacin for the gout pain in his right ankle, which has gone away, but has gout pain in his right big toe. Painful to walk. Pain level is 8-9/10. No swelling. No other symptoms. Requesting indomethacin refill.  Please

## 2021-10-06 NOTE — TELEPHONE ENCOUNTER
Verified name and . Patient was advised of Dr. Lana Varghese message as seen in previous charting note.    Warm transfer to CSS agentBrook to schedule annual physical.

## 2021-11-19 NOTE — TELEPHONE ENCOUNTER
please see message below and advise. Thanks What Is The Reason For Today's Visit?: Full Body Skin Examination What Is The Reason For Today's Visit? (Being Monitored For X): concerning skin lesions on an annual basis

## 2021-11-19 NOTE — TELEPHONE ENCOUNTER
Pt would like to know if the doctor can give him a refill on his indomethacin medication. Pt states that he is leaving on vacation on Tuesday and does not want to get a gout flare up.  If this gets approved please send it to pharmacy: [facility]/[place]

## 2021-11-20 RX ORDER — INDOMETHACIN 50 MG/1
50 CAPSULE ORAL
Qty: 15 CAPSULE | Refills: 0 | Status: SHIPPED | OUTPATIENT
Start: 2021-11-20 | End: 2021-12-04

## 2021-12-03 NOTE — TELEPHONE ENCOUNTER
Appointment scheduled for 12/9 at 8:45 am and states he only has 3 pills left and requesting a refill until this appointment due to being almost out of medication.

## 2021-12-04 RX ORDER — INDOMETHACIN 50 MG/1
50 CAPSULE ORAL
Qty: 15 CAPSULE | Refills: 0 | Status: SHIPPED | OUTPATIENT
Start: 2021-12-04 | End: 2021-12-13

## 2021-12-10 NOTE — TELEPHONE ENCOUNTER
Patient is requesting a refill until appointment on Tuesday 12/14. indomethacin 50 MG Oral Cap 15 capsule 0 12/4/2021    Sig:   Take 1 capsule (50 mg total) by mouth 3 (three) times daily with meals.  As needed for pain or inflammation from gout exacerba

## 2021-12-12 RX ORDER — INDOMETHACIN 50 MG/1
50 CAPSULE ORAL
Qty: 15 CAPSULE | Refills: 0 | OUTPATIENT
Start: 2021-12-12

## 2021-12-13 RX ORDER — INDOMETHACIN 50 MG/1
50 CAPSULE ORAL
Qty: 15 CAPSULE | Refills: 0 | Status: SHIPPED | OUTPATIENT
Start: 2021-12-13 | End: 2021-12-14 | Stop reason: ALTCHOICE

## 2021-12-13 NOTE — TELEPHONE ENCOUNTER
Patient following up for refill request of Indomethacin, was advised request denied due to no appt for over 1 yr, has been cancelling appts. Patient reports has appt tomorrow that he does plan on keeping.  Reports gout flare up has been getting worse, he's

## 2021-12-14 ENCOUNTER — OFFICE VISIT (OUTPATIENT)
Dept: FAMILY MEDICINE CLINIC | Facility: CLINIC | Age: 41
End: 2021-12-14
Payer: COMMERCIAL

## 2021-12-14 VITALS
DIASTOLIC BLOOD PRESSURE: 87 MMHG | BODY MASS INDEX: 35.65 KG/M2 | TEMPERATURE: 98 F | HEIGHT: 70 IN | SYSTOLIC BLOOD PRESSURE: 134 MMHG | HEART RATE: 89 BPM | WEIGHT: 249 LBS

## 2021-12-14 DIAGNOSIS — Z13.1 SCREENING FOR DIABETES MELLITUS: ICD-10-CM

## 2021-12-14 DIAGNOSIS — I10 ESSENTIAL HYPERTENSION: ICD-10-CM

## 2021-12-14 DIAGNOSIS — E79.0 HYPERURICEMIA: ICD-10-CM

## 2021-12-14 DIAGNOSIS — Z00.00 WELL ADULT EXAM: Primary | ICD-10-CM

## 2021-12-14 DIAGNOSIS — E66.9 OBESITY (BMI 30.0-34.9): ICD-10-CM

## 2021-12-14 DIAGNOSIS — M10.9 GOUT, UNSPECIFIED CAUSE, UNSPECIFIED CHRONICITY, UNSPECIFIED SITE: ICD-10-CM

## 2021-12-14 DIAGNOSIS — R74.8 ELEVATED LIVER ENZYMES: ICD-10-CM

## 2021-12-14 DIAGNOSIS — M25.50 POLYARTHRALGIA: ICD-10-CM

## 2021-12-14 PROBLEM — R07.89 ATYPICAL CHEST PAIN: Status: RESOLVED | Noted: 2020-10-15 | Resolved: 2021-12-14

## 2021-12-14 PROCEDURE — 3075F SYST BP GE 130 - 139MM HG: CPT | Performed by: FAMILY MEDICINE

## 2021-12-14 PROCEDURE — 3008F BODY MASS INDEX DOCD: CPT | Performed by: FAMILY MEDICINE

## 2021-12-14 PROCEDURE — 3079F DIAST BP 80-89 MM HG: CPT | Performed by: FAMILY MEDICINE

## 2021-12-14 PROCEDURE — 90686 IIV4 VACC NO PRSV 0.5 ML IM: CPT | Performed by: FAMILY MEDICINE

## 2021-12-14 PROCEDURE — 90471 IMMUNIZATION ADMIN: CPT | Performed by: FAMILY MEDICINE

## 2021-12-14 PROCEDURE — 99396 PREV VISIT EST AGE 40-64: CPT | Performed by: FAMILY MEDICINE

## 2021-12-14 RX ORDER — COLCHICINE 0.6 MG/1
0.6 TABLET ORAL DAILY
Qty: 30 TABLET | Refills: 1 | Status: SHIPPED | OUTPATIENT
Start: 2021-12-14

## 2021-12-14 RX ORDER — LOSARTAN POTASSIUM 50 MG/1
50 TABLET ORAL 2 TIMES DAILY
Qty: 180 TABLET | Refills: 1 | Status: SHIPPED | OUTPATIENT
Start: 2021-12-14 | End: 2022-03-14

## 2021-12-14 NOTE — PROGRESS NOTES
HPI:    Patient ID: Lui Kelly is a 39year old male.     HPI  Patient presents with:  Routine Physical    Wt Readings from Last 6 Encounters:  12/14/21 : 249 lb (112.9 kg)  07/20/21 : 238 lb (108 kg)  05/25/21 : 246 lb (111.6 kg)  03/08/21 : 230 lb (104 Negative for rash. Neurological: Negative for dizziness, seizures, syncope, speech difficulty, weakness, light-headedness, numbness and headaches.    Psychiatric/Behavioral: Negative for behavioral problems, decreased concentration, self-injury, sleep dis Resource Strain: Not on file  Food Insecurity: Not on file  Transportation Needs: Not on file  Physical Activity: Not on file  Stress: Not on file  Social Connections: Not on file  Intimate Partner Violence: Not on file  Housing Stability: Not on file  [place] Rhythm: Normal rate and regular rhythm. Heart sounds: Normal heart sounds. No murmur heard. Pulmonary:      Effort: Pulmonary effort is normal.      Breath sounds: Normal breath sounds. No wheezing.    Abdominal:      General: Bowel sounds are nor Hyperuricemia  Check levels  Continue allopurinol  Will adjust dose based on labs  - URIC ACID  - RHEUMATOLOGY - INTERNAL    4. Gout, unspecified cause, unspecified chronicity, unspecified site    - RHEUMATOLOGY - INTERNAL  - colchicine 0.6 MG Oral Tab;  Ta

## 2022-07-26 ENCOUNTER — NURSE TRIAGE (OUTPATIENT)
Dept: FAMILY MEDICINE CLINIC | Facility: CLINIC | Age: 42
End: 2022-07-26

## 2022-07-26 DIAGNOSIS — M10.9 GOUT, UNSPECIFIED CAUSE, UNSPECIFIED CHRONICITY, UNSPECIFIED SITE: ICD-10-CM

## 2022-07-26 RX ORDER — COLCHICINE 0.6 MG/1
0.6 TABLET ORAL DAILY
Qty: 30 TABLET | Refills: 1 | Status: SHIPPED | OUTPATIENT
Start: 2022-07-26

## 2022-09-27 ENCOUNTER — APPOINTMENT (OUTPATIENT)
Dept: CT IMAGING | Age: 42
End: 2022-09-27
Payer: COMMERCIAL

## 2022-09-27 ENCOUNTER — TELEPHONE (OUTPATIENT)
Dept: FAMILY MEDICINE CLINIC | Facility: CLINIC | Age: 42
End: 2022-09-27

## 2022-09-27 ENCOUNTER — HOSPITAL ENCOUNTER (OUTPATIENT)
Age: 42
Discharge: HOME | End: 2022-09-27
Payer: COMMERCIAL

## 2022-09-27 VITALS
HEIGHT: 70 IN | TEMPERATURE: 98 F | HEART RATE: 86 BPM | BODY MASS INDEX: 34.36 KG/M2 | WEIGHT: 240 LBS | OXYGEN SATURATION: 97 % | SYSTOLIC BLOOD PRESSURE: 155 MMHG | RESPIRATION RATE: 16 BRPM | DIASTOLIC BLOOD PRESSURE: 99 MMHG

## 2022-09-27 DIAGNOSIS — K52.9 COLITIS: ICD-10-CM

## 2022-09-27 DIAGNOSIS — R10.12 ABDOMINAL PAIN, LEFT UPPER QUADRANT: Primary | ICD-10-CM

## 2022-09-27 LAB
#MXD IC: 1.1 X10ˆ3/UL (ref 0.1–1)
BUN BLD-MCNC: 6 MG/DL (ref 7–18)
CHLORIDE BLD-SCNC: 97 MMOL/L (ref 98–112)
CLARITY UR: CLEAR
CO2 BLD-SCNC: 26 MMOL/L (ref 21–32)
CREAT BLD-MCNC: 0.8 MG/DL
GFR SERPLBLD BASED ON 1.73 SQ M-ARVRAT: 113 ML/MIN/1.73M2 (ref 60–?)
GLUCOSE BLD-MCNC: 258 MG/DL (ref 70–99)
GLUCOSE UR STRIP-MCNC: 500 MG/DL
HCT VFR BLD AUTO: 49.2 %
HCT VFR BLD CALC: 56 %
HGB BLD-MCNC: 16.9 G/DL
HGB UR QL STRIP: NEGATIVE
ISTAT IONIZED CALCIUM FOR CHEM 8: 1.27 MMOL/L (ref 1.12–1.32)
KETONES UR STRIP-MCNC: 15 MG/DL
LEUKOCYTE ESTERASE UR QL STRIP: NEGATIVE
LYMPHOCYTES # BLD AUTO: 2.4 X10ˆ3/UL (ref 1–4)
LYMPHOCYTES NFR BLD AUTO: 26.1 %
MCH RBC QN AUTO: 31 PG (ref 26–34)
MCHC RBC AUTO-ENTMCNC: 34.3 G/DL (ref 31–37)
MCV RBC AUTO: 90.1 FL (ref 80–100)
MIXED CELL %: 11.6 %
NEUTROPHILS # BLD AUTO: 5.6 X10ˆ3/UL (ref 1.5–7.7)
NEUTROPHILS NFR BLD AUTO: 62.3 %
NITRITE UR QL STRIP: NEGATIVE
PH UR STRIP: 6 [PH]
PLATELET # BLD AUTO: 235 X10ˆ3/UL (ref 150–450)
POTASSIUM BLD-SCNC: 4.3 MMOL/L (ref 3.6–5.1)
PROT UR STRIP-MCNC: 30 MG/DL
RBC # BLD AUTO: 5.46 X10ˆ6/UL
SODIUM BLD-SCNC: 136 MMOL/L (ref 136–145)
SP GR UR STRIP: 1.02
UROBILINOGEN UR STRIP-ACNC: <2 MG/DL
WBC # BLD AUTO: 9.1 X10ˆ3/UL (ref 4–11)

## 2022-09-27 PROCEDURE — 85025 COMPLETE CBC W/AUTO DIFF WBC: CPT

## 2022-09-27 PROCEDURE — 81002 URINALYSIS NONAUTO W/O SCOPE: CPT

## 2022-09-27 PROCEDURE — 74177 CT ABD & PELVIS W/CONTRAST: CPT

## 2022-09-27 PROCEDURE — 80047 BASIC METABLC PNL IONIZED CA: CPT

## 2022-09-27 PROCEDURE — 99214 OFFICE O/P EST MOD 30 MIN: CPT

## 2022-09-27 RX ORDER — MAGNESIUM HYDROXIDE/ALUMINUM HYDROXICE/SIMETHICONE 120; 1200; 1200 MG/30ML; MG/30ML; MG/30ML
30 SUSPENSION ORAL ONCE
Status: COMPLETED | OUTPATIENT
Start: 2022-09-27 | End: 2022-09-27

## 2022-09-27 RX ORDER — LIDOCAINE HYDROCHLORIDE 20 MG/ML
10 SOLUTION OROPHARYNGEAL ONCE
Status: COMPLETED | OUTPATIENT
Start: 2022-09-27 | End: 2022-09-27

## 2022-09-27 RX ORDER — DICYCLOMINE HYDROCHLORIDE 10 MG/5ML
20 SOLUTION ORAL ONCE
Status: COMPLETED | OUTPATIENT
Start: 2022-09-27 | End: 2022-09-27

## 2022-09-27 NOTE — ED INITIAL ASSESSMENT (HPI)
Pt reports constant epigastric abdominal pain since Friday. Denies vomiting. Mild nausea. Last bm this morning. Denies urinary symptoms. Denies fevers. Pain worsens after eating.

## 2022-09-27 NOTE — TELEPHONE ENCOUNTER
Pt called us back and was informed of Dr. Adilia Cho message below and he verbalized understanding. Pt will call [facility] to make a appt.

## 2022-09-27 NOTE — TELEPHONE ENCOUNTER
Received a call from [facility] NP from urgent care,    Patient being seen there for abdominal pain. His blood sugar was 258. Pain is mild. He has been sent for CT of the abdomen which he is currently getting down. Please schedule a follow-up for patient in office in the next few days.   Likely new diabetic  He needs to get labs done tomorrow (fasting)  Go to ER if abd pain worsens

## 2022-09-28 PROCEDURE — 3046F HEMOGLOBIN A1C LEVEL >9.0%: CPT | Performed by: FAMILY MEDICINE

## 2022-09-29 ENCOUNTER — OFFICE VISIT (OUTPATIENT)
Dept: FAMILY MEDICINE CLINIC | Facility: CLINIC | Age: 42
End: 2022-09-29

## 2022-09-29 VITALS
HEART RATE: 65 BPM | BODY MASS INDEX: 35.67 KG/M2 | HEIGHT: 70 IN | OXYGEN SATURATION: 96 % | SYSTOLIC BLOOD PRESSURE: 154 MMHG | TEMPERATURE: 97 F | WEIGHT: 249.19 LBS | DIASTOLIC BLOOD PRESSURE: 90 MMHG

## 2022-09-29 DIAGNOSIS — I10 ESSENTIAL HYPERTENSION: ICD-10-CM

## 2022-09-29 DIAGNOSIS — E11.65 UNCONTROLLED TYPE 2 DIABETES MELLITUS WITH HYPERGLYCEMIA (HCC): ICD-10-CM

## 2022-09-29 DIAGNOSIS — R74.8 ELEVATED LIVER ENZYMES: ICD-10-CM

## 2022-09-29 DIAGNOSIS — R19.7 DIARRHEA, UNSPECIFIED TYPE: ICD-10-CM

## 2022-09-29 DIAGNOSIS — R79.89 ELEVATED TSH: ICD-10-CM

## 2022-09-29 DIAGNOSIS — K52.9 COLITIS: ICD-10-CM

## 2022-09-29 DIAGNOSIS — E78.00 HYPERCHOLESTEROLEMIA: ICD-10-CM

## 2022-09-29 DIAGNOSIS — R10.84 GENERALIZED ABDOMINAL PAIN: Primary | ICD-10-CM

## 2022-09-29 PROBLEM — E66.01 MORBID (SEVERE) OBESITY DUE TO EXCESS CALORIES (HCC): Status: ACTIVE | Noted: 2022-09-29

## 2022-09-29 LAB
ABSOLUTE BASOPHILS: 92 CELLS/UL (ref 0–200)
ABSOLUTE EOSINOPHILS: 270 CELLS/UL (ref 15–500)
ABSOLUTE LYMPHOCYTES: 2349 CELLS/UL (ref 850–3900)
ABSOLUTE MONOCYTES: 770 CELLS/UL (ref 200–950)
ABSOLUTE NEUTROPHILS: 4220 CELLS/UL (ref 1500–7800)
ALBUMIN/GLOBULIN RATIO: 1.3 (CALC) (ref 1–2.5)
ALBUMIN: 4.4 G/DL (ref 3.6–5.1)
ALKALINE PHOSPHATASE: 134 U/L (ref 36–130)
ALT: 64 U/L (ref 9–46)
AST: 79 U/L (ref 10–40)
BASOPHILS: 1.2 %
BILIRUBIN, TOTAL: 0.9 MG/DL (ref 0.2–1.2)
BUN: 7 MG/DL (ref 7–25)
C-REACTIVE PROTEIN: 20.7 MG/L
CALCIUM: 10 MG/DL (ref 8.6–10.3)
CARBON DIOXIDE: 27 MMOL/L (ref 20–32)
CHLORIDE: 99 MMOL/L (ref 98–110)
CHOL/HDLC RATIO: 5.1 (CALC)
CHOLESTEROL, TOTAL: 244 MG/DL
CREATININE: 0.86 MG/DL (ref 0.6–1.29)
EGFR: 111 ML/MIN/1.73M2
EOSINOPHILS: 3.5 %
GLOBULIN: 3.5 G/DL (CALC) (ref 1.9–3.7)
GLUCOSE: 239 MG/DL (ref 65–99)
HDL CHOLESTEROL: 48 MG/DL
HEMATOCRIT: 48.3 % (ref 38.5–50)
HEMOGLOBIN A1C: 10.1 % OF TOTAL HGB
HEMOGLOBIN: 16.8 G/DL (ref 13.2–17.1)
LDL-CHOLESTEROL: 172 MG/DL (CALC)
LYMPHOCYTES: 30.5 %
MCH: 31.5 PG (ref 27–33)
MCHC: 34.8 G/DL (ref 32–36)
MCV: 90.6 FL (ref 80–100)
MONOCYTES: 10 %
MPV: 12.2 FL (ref 7.5–12.5)
NEUTROPHILS: 54.8 %
NITRITE: NEGATIVE
NON-HDL CHOLESTEROL: 196 MG/DL (CALC)
OCCULT BLOOD: NEGATIVE
PLATELET COUNT: 245 THOUSAND/UL (ref 140–400)
POTASSIUM: 4.4 MMOL/L (ref 3.5–5.3)
PROTEIN, TOTAL: 7.9 G/DL (ref 6.1–8.1)
RDW: 11.6 % (ref 11–15)
RED BLOOD CELL COUNT: 5.33 MILLION/UL (ref 4.2–5.8)
RHEUMATOID FACTOR: <14 IU/ML
SED RATE BY MODIFIED$WESTERGREN: 28 MM/H
SODIUM: 136 MMOL/L (ref 135–146)
SPECIFIC GRAVITY: 1.03 (ref 1–1.03)
T4, FREE: 1.2 NG/DL (ref 0.8–1.8)
TRIGLYCERIDES: 117 MG/DL
TSH W/REFLEX TO FT4: 4.59 MIU/L (ref 0.4–4.5)
URIC ACID: 5.8 MG/DL (ref 4–8)
WHITE BLOOD CELL COUNT: 7.7 THOUSAND/UL (ref 3.8–10.8)

## 2022-09-29 RX ORDER — METFORMIN HYDROCHLORIDE 500 MG/1
500 TABLET, EXTENDED RELEASE ORAL 2 TIMES DAILY WITH MEALS
Qty: 180 TABLET | Refills: 0 | Status: SHIPPED | OUTPATIENT
Start: 2022-09-29 | End: 2022-12-28

## 2022-09-29 RX ORDER — COVID-19 ANTIGEN TEST
KIT MISCELLANEOUS
COMMUNITY
Start: 2022-09-07

## 2022-09-30 PROBLEM — R79.89 ELEVATED TSH: Status: ACTIVE | Noted: 2022-09-30

## 2022-10-10 ENCOUNTER — HOSPITAL ENCOUNTER (OUTPATIENT)
Dept: ULTRASOUND IMAGING | Age: 42
Discharge: HOME | End: 2022-10-10
Attending: FAMILY MEDICINE
Payer: COMMERCIAL

## 2022-10-10 DIAGNOSIS — R74.8 ELEVATED LIVER ENZYMES: ICD-10-CM

## 2022-10-10 PROCEDURE — 76705 ECHO EXAM OF ABDOMEN: CPT | Performed by: FAMILY MEDICINE

## 2022-10-13 ENCOUNTER — OFFICE VISIT (OUTPATIENT)
Dept: FAMILY MEDICINE CLINIC | Facility: CLINIC | Age: 42
End: 2022-10-13
Payer: COMMERCIAL

## 2022-10-13 VITALS
HEART RATE: 80 BPM | WEIGHT: 247 LBS | BODY MASS INDEX: 35.36 KG/M2 | DIASTOLIC BLOOD PRESSURE: 101 MMHG | SYSTOLIC BLOOD PRESSURE: 164 MMHG | HEIGHT: 70 IN

## 2022-10-13 DIAGNOSIS — Z23 NEED FOR PNEUMOCOCCAL VACCINATION: ICD-10-CM

## 2022-10-13 DIAGNOSIS — I10 ESSENTIAL HYPERTENSION: ICD-10-CM

## 2022-10-13 DIAGNOSIS — R19.7 DIARRHEA, UNSPECIFIED TYPE: ICD-10-CM

## 2022-10-13 DIAGNOSIS — E78.00 HYPERCHOLESTEROLEMIA: ICD-10-CM

## 2022-10-13 DIAGNOSIS — E11.65 UNCONTROLLED TYPE 2 DIABETES MELLITUS WITH HYPERGLYCEMIA (HCC): ICD-10-CM

## 2022-10-13 DIAGNOSIS — K52.9 COLITIS: ICD-10-CM

## 2022-10-13 DIAGNOSIS — Z23 NEED FOR INFLUENZA VACCINATION: ICD-10-CM

## 2022-10-13 DIAGNOSIS — R74.8 ELEVATED LIVER ENZYMES: ICD-10-CM

## 2022-10-13 DIAGNOSIS — R10.84 GENERALIZED ABDOMINAL PAIN: Primary | ICD-10-CM

## 2022-10-13 PROCEDURE — 90472 IMMUNIZATION ADMIN EACH ADD: CPT | Performed by: FAMILY MEDICINE

## 2022-10-13 PROCEDURE — 90686 IIV4 VACC NO PRSV 0.5 ML IM: CPT | Performed by: FAMILY MEDICINE

## 2022-10-13 PROCEDURE — 3080F DIAST BP >= 90 MM HG: CPT | Performed by: FAMILY MEDICINE

## 2022-10-13 PROCEDURE — 99214 OFFICE O/P EST MOD 30 MIN: CPT | Performed by: FAMILY MEDICINE

## 2022-10-13 PROCEDURE — 90677 PCV20 VACCINE IM: CPT | Performed by: FAMILY MEDICINE

## 2022-10-13 PROCEDURE — 3008F BODY MASS INDEX DOCD: CPT | Performed by: FAMILY MEDICINE

## 2022-10-13 PROCEDURE — 90471 IMMUNIZATION ADMIN: CPT | Performed by: FAMILY MEDICINE

## 2022-10-13 PROCEDURE — 3077F SYST BP >= 140 MM HG: CPT | Performed by: FAMILY MEDICINE

## 2022-10-13 RX ORDER — LOSARTAN POTASSIUM 50 MG/1
50 TABLET ORAL DAILY
Qty: 90 TABLET | Refills: 0 | Status: SHIPPED | OUTPATIENT
Start: 2022-10-13

## 2022-10-13 RX ORDER — ROSUVASTATIN CALCIUM 10 MG/1
10 TABLET, COATED ORAL NIGHTLY
Qty: 90 TABLET | Refills: 0 | Status: SHIPPED | OUTPATIENT
Start: 2022-10-13

## 2022-10-21 ENCOUNTER — HOSPITAL ENCOUNTER (OUTPATIENT)
Dept: ENDOCRINOLOGY | Facility: HOSPITAL | Age: 42
Discharge: HOME | End: 2022-10-21
Attending: FAMILY MEDICINE
Payer: COMMERCIAL

## 2022-10-21 VITALS — BODY MASS INDEX: 35 KG/M2 | WEIGHT: 246 LBS

## 2022-10-21 DIAGNOSIS — E11.65 UNCONTROLLED TYPE 2 DIABETES MELLITUS WITH HYPERGLYCEMIA (HCC): Primary | ICD-10-CM

## 2022-10-21 PROCEDURE — 97802 MEDICAL NUTRITION INDIV IN: CPT

## 2022-10-21 NOTE — PROGRESS NOTES
Medical Nutrition Therapy Assessment    Jose Enrique Harris 7/2/1980 was seen for individual Diabetic Medical Nutrition Therapy: initial/ individual    Date: 10/21/2022   Start time 0945  End time: 65      Assessment  Pt with new onset diabetes; his wife is with him today. They state that they were very surprised by the diagnosis. Pt states that he has lost weight and has not weighed less than this for many years, but he wants to lose some more to control his sugars. He works nights, 4p-10pm; sleeps only 4-5 hours a night; has 3 children  He states that he does not eat sweets, use sugar or drink sugary beverages but has habit of eating only 1 meal per day on weekdays. This meal is home-cooked but he admits that portions are large. He states that he does not know where his excess calories are coming from. He says he drank too much (regular beer) during the pandemic and has greatly cut his intake, switching to wine. Anthropometrics: There were no vitals taken for this visit.    246#    Current diabetes medications:  Metformin- 1000 mg with main meal, 500mg with other meal    Labs:  Lab Results   Component Value Date    A1C 10.1 (H) 09/28/2022    CHOLEST 244 (H) 09/28/2022     (H) 09/28/2022    HDL 48 09/28/2022    NONHDLC 196 (H) 09/28/2022    TRIG 117 09/28/2022    BUN 7 09/28/2022    BUN 11 07/20/2021    CREATSERUM 0.86 09/28/2022    CREATSERUM 0.94 07/20/2021    GFRNAA 100 07/20/2021    GFRNAA 96 10/12/2020    GFRAA 116 07/20/2021    GFRAA 111 10/12/2020       SMBG at home: yes  Frequency of testing 2 times per day  BG results:    FBG: after work: still in 200s mg/dl        Diet Hx:  (a.m.) after work- does not eat  (mid-day) does not always eat before work  (p.m.) pt vague about usual intake but he eats what his wife cooks which seems to be a balanced meal  (snacks) pt says he does not snack  (fluids) water, juice- not every day but drinks a couple of glasses when he does drink it    Physical Activity:   Work is active but he does not do additional exercise      Nutrition Diagnosis  Intake: inconsistent carbohydrate intake  Behavioral and environmental: nutrition and nutrition-related knowledge deficit      Intervention  Comprehensive Nutrition Education Provided:  X- overview of pathophysiology of diabetes; answered pt's questions about what can happen if diabetes is not controlled, reviewed BG testing schedule and BG targets. [x] discussed healthy weight management, glucose management, lipid management, and BP management as related to diabetes   [x] discussed basic meal planning guidelines for diabetes regular mealtime, limited concentrated sweets. Worked on establishing eating pattern/timing of meals and snacks    [x] discussed in depth meal planning using the healthy eating with diabetes plate method with focus on balanced macronutrient consumption, including identifying foods that are carbohydrates, lean protein, non-starchy vegetables, and heart healthy fats   [x] stressed importance of carbohydrate consistency in each meal   [x] recommended carbohydrate targets of 60 grams at meals    [x] educated on label reading   [x] educated on portion control; including use of measuring cups, spoons, or food scale       Plan  Blood sugar goals: less than 130 mg/dl in the morning and less than 180 mg/dl 2 hours after meals. Keep glucose tabs or fast acting carbohydrates with you for treatment of lows; for blood glucose levels less than 70 mg/dl, take 15 grams of fast acting carbohydrates (3-4 glucose tabs, 4 ounces of juice, or as discussed). Retest in 15 min. If not above 70 mg/dl, retreat. Call Erasmo Meckel, RD at [phone] (option 3) for problems/concerns. No follow-ups on file. - Encouraged pt and wife to attend DSMT group classes.     Erasmo Meckel, RD

## 2023-08-21 ENCOUNTER — NURSE TRIAGE (OUTPATIENT)
Dept: FAMILY MEDICINE CLINIC | Facility: CLINIC | Age: 43
End: 2023-08-21

## 2023-08-21 NOTE — TELEPHONE ENCOUNTER
Future Appointments   Date Time Provider Kolby Thompson   8/23/2023  1:00 PM Shaye Alicia MD [facility]     Patient notified, verbalized understanding.

## 2023-08-21 NOTE — TELEPHONE ENCOUNTER
Action Requested: Summary for Provider     []  Critical Lab, Recommendations Needed  [] Need Additional Advice  []   FYI    []   Need Orders  [x] Need Medications Sent to Pharmacy  []  Other     SUMMARY: Per protocol: OV. Patient states he only has one car between him, his wife and his son. He is unable to come into the office until the end of the week. He declined a video visit. Reason for call: Penis/Scrotum Problem  Onset: Data Unavailable      Patient states that he went to Dr. Aisha Gregory prior to [facility] and when he got a yeast infection from his wife. He believes that he has the same issue. Patient's penis is very itchy and the area is red. He is requesting an ointment for yeast. Denies trouble urinating, fever, spreading redness, blood or discharge. Per patient's chart, he was seen on 2/20/19 for yeast and prescribed Nystatin 591,349 units/g. 1 application topical three times daily.      Reason for Disposition   SEVERE itching (i.e., interferes with work or school)    Protocols used: Penis and Scrotum Symptoms-A-OH

## 2023-09-25 ENCOUNTER — HOSPITAL ENCOUNTER (OUTPATIENT)
Age: 43
Discharge: HOME | End: 2023-09-25
Payer: COMMERCIAL

## 2023-09-25 VITALS
TEMPERATURE: 98 F | DIASTOLIC BLOOD PRESSURE: 110 MMHG | OXYGEN SATURATION: 98 % | HEART RATE: 90 BPM | SYSTOLIC BLOOD PRESSURE: 183 MMHG | RESPIRATION RATE: 16 BRPM

## 2023-09-25 DIAGNOSIS — N48.1 BALANITIS: Primary | ICD-10-CM

## 2023-09-25 LAB — GLUCOSE BLDC GLUCOMTR-MCNC: 282 MG/DL (ref 70–99)

## 2023-09-25 PROCEDURE — 99213 OFFICE O/P EST LOW 20 MIN: CPT | Performed by: PHYSICIAN ASSISTANT

## 2023-09-25 PROCEDURE — 82962 GLUCOSE BLOOD TEST: CPT | Performed by: PHYSICIAN ASSISTANT

## 2023-09-25 RX ORDER — CLOTRIMAZOLE AND BETAMETHASONE DIPROPIONATE 10; .64 MG/G; MG/G
1 CREAM TOPICAL 2 TIMES DAILY
Qty: 15 G | Refills: 0 | Status: SHIPPED | OUTPATIENT
Start: 2023-09-25 | End: 2023-10-09

## 2023-09-25 NOTE — ED INITIAL ASSESSMENT (HPI)
Pt with rash to penis x2 days. Pt reports itchiness and discomfort. Pt stated he has had problem in the past and was prescribed Nystatin.

## 2023-09-30 ENCOUNTER — TELEPHONE (OUTPATIENT)
Dept: FAMILY MEDICINE CLINIC | Facility: CLINIC | Age: 43
End: 2023-09-30

## 2023-11-22 ENCOUNTER — TELEPHONE (OUTPATIENT)
Dept: FAMILY MEDICINE CLINIC | Facility: CLINIC | Age: 43
End: 2023-11-22

## 2023-11-22 NOTE — TELEPHONE ENCOUNTER
Reminder sent for Patient to schedule DM eye exam or to fax over results if exam was completed at [facility].

## 2024-01-16 ENCOUNTER — NURSE TRIAGE (OUTPATIENT)
Dept: FAMILY MEDICINE CLINIC | Facility: CLINIC | Age: 44
End: 2024-01-16

## 2024-01-16 ENCOUNTER — TELEMEDICINE (OUTPATIENT)
Dept: FAMILY MEDICINE CLINIC | Facility: CLINIC | Age: 44
End: 2024-01-16
Payer: COMMERCIAL

## 2024-01-16 DIAGNOSIS — R21 RASH OF PENIS: Primary | ICD-10-CM

## 2024-01-16 PROCEDURE — 99213 OFFICE O/P EST LOW 20 MIN: CPT | Performed by: FAMILY MEDICINE

## 2024-01-16 RX ORDER — NYSTATIN 100000 U/G
1 CREAM TOPICAL 2 TIMES DAILY
Qty: 30 G | Refills: 1 | Status: SHIPPED | OUTPATIENT
Start: 2024-01-16

## 2024-01-16 NOTE — PROGRESS NOTES
This visit is conducted using Telemedicine with live, interactive video and audio during this Coronavirus pandemic.    Please note that the following visit was completed using two-way, real-time interactive audio and/or video communication.  This has been done in good shanna to provide continuity of care in the best interest of the provider-patient relationship, due to the ongoing public health crisis/national emergency and because of restrictions of visitation.  There are limitations of this visit as no physical exam could be performed.  Every conscious effort was taken to allow for sufficient and adequate time.  This billing was spent on reviewing labs, medications, radiology tests and decision making.  Appropriate medical decision-making and tests are ordered as detailed in the plan of care below    EDDIE SHAW or legal guardian   verbally consents to a Virtual/Telephone/ VideoCheck-In service on 1/16/2024  Patient understands and accepts financial responsibility for any deductible, co-insurance and/or co-pays associated with this service.    Duration of the service: 9 mins 55secs      HPI:    Eddie Shaw is a 43 year old male.      No chief complaint on file.    Rash is itchy , red . Started 2-3 days ago.  Similar rash in past  Sexually active with wife  She does not have a rash.  Hs been monogamous.      ROS  A comprehensive 10 point review of systems was completed.  Pertinent positives and negatives noted in the the HPI.      PATIENTS DENIES ANY KNOWN EXPOSURE TO ANYONE CONFIRMED FOR COVID 19.      Patient Active Problem List   Diagnosis    Elevated liver enzymes    Hyperuricemia    Obesity (BMI 30.0-34.9)    Essential hypertension    Uncontrolled type 2 diabetes mellitus with hyperglycemia (HCC)    Hypercholesterolemia    Morbid (severe) obesity due to excess calories (HCC)    Elevated TSH       Past Medical History:   Diagnosis Date    Diabetes (HCC)     Essential hypertension     Gout 2011    X 2            MEDICATION LIST    Current Outpatient Medications:     nystatin 100,000 Units/g External Cream, Apply 1 Application topically 2 (two) times daily., Disp: 30 g, Rfl: 1    Lancets (ONETOUCH DELICA PLUS [id number]) Does not apply Misc, 1 strip by In Vitro route 2 (two) times daily., Disp: 100 each, Rfl: 6    Glucose Blood (ONETOUCH ULTRA) In Vitro Strip, Use as directed twice a  day, Disp: 200 strip, Rfl: 1    losartan 50 MG Oral Tab, Take 1 tablet (50 mg total) by mouth daily., Disp: 90 tablet, Rfl: 0    rosuvastatin (CRESTOR) 10 MG Oral Tab, Take 1 tablet (10 mg total) by mouth nightly. HOLD WHEN TAKING COLCHICINE, Disp: 90 tablet, Rfl: 0    QUICKVUE AT-HOME COVID-19 TEST In Vitro Kit, TAKE AS DIRECTED ON THE PACKAGING. (Patient not taking: Reported on 9/29/2022), Disp: , Rfl:     colchicine 0.6 MG Oral Tab, Take 1 tablet (0.6 mg total) by mouth daily., Disp: 30 tablet, Rfl: 1    allopurinol 100 MG Oral Tab, Take 1 tablet (100 mg total) by mouth daily., Disp: 90 tablet, Rfl: 0    ALLERGY LIST  Allergies   Allergen Reactions    Adhesive Tape RASH       Allergies:  Allergies   Allergen Reactions    Adhesive Tape RASH       PHYSICAL EXAM:     Vitals signs taken at home if available:    There were no vitals taken for this visit.      Limited examination for this telephone/ video visit due to the coronavirus emergency      General Appearance:  alert, well developed, in no acute distress  Not in acute distress, comfortably seated in own residence  Patient was speaking in complete sentences, no increased work of breathing and very coherent and alert on the phone.  Throat/Neck: normal sound to voice.   Respiratory:  non-labored. no increased work of breathing.    Skin:  normal moisture and skin texture, normal color of skin, no jaundice  ERYTHEMA ON SHAFT OF PENIS  Hematologic:  no excessive bruising  Psychiatric:  oriented to time, self, and place  Patient was responding to questions  appropriately.        ASSESSMENT/PLAN:     Encounter Diagnosis   Name Primary?    Rash of penis Yes       1. Rash of penis  KEEP skin clean and dry  Use cream as directed   - nystatin 100,000 Units/g External Cream; Apply 1 Application topically 2 (two) times daily.  Dispense: 30 g; Refill: 1          Patient reminded to practice good health and safety measures including washing hands, social distancing, covering mouth when coughing/ sneezing, avoid social meetings/ gatherings in face of this Covid 19 pandemic.    Patient verbalized understanding of plan and all questions answered to the best of my ability.    Patient to call back if any change/ worsening of symptoms.      No orders of the defined types were placed in this encounter.      Meds This Visit:  Requested Prescriptions     Signed Prescriptions Disp Refills    nystatin 100,000 Units/g External Cream 30 g 1     Sig: Apply 1 Application topically 2 (two) times daily.       Imaging & Referrals:  None        Ger St MD    1/16/2024  4:19 PM

## 2024-01-16 NOTE — TELEPHONE ENCOUNTER
Patient called to request a lotion that was prescribed to him almost 2 years ago for his private area. He states it has flared up on him. He cannot come in for an office visit because his car wont start and hoping to JUST GET A PRESCRIPTION.Pharmacy on file verified.

## 2024-01-16 NOTE — TELEPHONE ENCOUNTER
Action Requested: Summary for Provider     []  Critical Lab, Recommendations Needed  [] Need Additional Advice  []   FYI    []   Need Orders  [] Need Medications Sent to Pharmacy  []  Other     SUMMARY: Patient scheduled for a video visit today at 4pm with .    Patient states he can not come in for appointment due to car won's start.     Patient asking for refill of antifungal cream prescribed in 2022 for jock itch, redness and irritation of penis.  Patient states he has also been to urgent care for this issue in the past.     Patient advised a video visit to discuss.     Reason for call: Medication Question and Rash      Reason for Disposition   Diabetes mellitus or a weak immune system (e.g., HIV positive, cancer chemotherapy, transplant patient)    Protocols used: John Itch-A-OH

## 2024-01-19 ENCOUNTER — MOBILE ENCOUNTER (OUTPATIENT)
Dept: FAMILY MEDICINE CLINIC | Facility: CLINIC | Age: 44
End: 2024-01-19

## 2024-01-19 DIAGNOSIS — M10.9 GOUT, UNSPECIFIED CAUSE, UNSPECIFIED CHRONICITY, UNSPECIFIED SITE: Primary | ICD-10-CM

## 2024-01-19 RX ORDER — COLCHICINE 0.6 MG/1
0.6 TABLET ORAL DAILY
Qty: 30 TABLET | Refills: 1 | Status: SHIPPED | OUTPATIENT
Start: 2024-01-19

## 2024-01-21 ENCOUNTER — TELEPHONE (OUTPATIENT)
Dept: FAMILY MEDICINE CLINIC | Facility: CLINIC | Age: 44
End: 2024-01-21

## 2024-01-21 NOTE — TELEPHONE ENCOUNTER
On call note-pt calling stating that his foot is swollen and red-is having a gout flare. Does not have any colchicine at home    Pt advised to elevate and ice foot  Refill given for colchicine    Pt advised to follow up wit pcp if symptoms are not improving or worsening

## 2024-07-18 ENCOUNTER — TELEPHONE (OUTPATIENT)
Dept: FAMILY MEDICINE CLINIC | Facility: CLINIC | Age: 44
End: 2024-07-18

## 2024-07-18 NOTE — TELEPHONE ENCOUNTER
Patient is due for diabetic follow up w/PCP.  Left message to call back.  Please assist with scheduling.

## 2025-01-28 ENCOUNTER — LAB ENCOUNTER (OUTPATIENT)
Dept: LAB | Age: 45
End: 2025-01-28
Attending: FAMILY MEDICINE
Payer: COMMERCIAL

## 2025-01-28 ENCOUNTER — OFFICE VISIT (OUTPATIENT)
Dept: FAMILY MEDICINE CLINIC | Facility: CLINIC | Age: 45
End: 2025-01-28
Payer: COMMERCIAL

## 2025-01-28 VITALS
TEMPERATURE: 97 F | HEIGHT: 70 IN | WEIGHT: 229 LBS | SYSTOLIC BLOOD PRESSURE: 182 MMHG | HEART RATE: 96 BPM | BODY MASS INDEX: 32.78 KG/M2 | DIASTOLIC BLOOD PRESSURE: 109 MMHG

## 2025-01-28 DIAGNOSIS — E78.00 HYPERCHOLESTEROLEMIA: ICD-10-CM

## 2025-01-28 DIAGNOSIS — Z00.00 WELL ADULT EXAM: Primary | ICD-10-CM

## 2025-01-28 DIAGNOSIS — I10 ESSENTIAL HYPERTENSION: ICD-10-CM

## 2025-01-28 DIAGNOSIS — M79.671 FOOT PAIN, BILATERAL: ICD-10-CM

## 2025-01-28 DIAGNOSIS — M79.672 FOOT PAIN, BILATERAL: ICD-10-CM

## 2025-01-28 DIAGNOSIS — E79.0 HYPERURICEMIA: ICD-10-CM

## 2025-01-28 DIAGNOSIS — E11.65 UNCONTROLLED TYPE 2 DIABETES MELLITUS WITH HYPERGLYCEMIA (HCC): ICD-10-CM

## 2025-01-28 DIAGNOSIS — K62.5 RECTAL BLEEDING: Primary | ICD-10-CM

## 2025-01-28 DIAGNOSIS — R19.4 CHANGE IN BOWEL HABITS: ICD-10-CM

## 2025-01-28 DIAGNOSIS — Z00.00 WELL ADULT EXAM: ICD-10-CM

## 2025-01-28 LAB
ATRIAL RATE: 86 BPM
P AXIS: 31 DEGREES
P-R INTERVAL: 146 MS
Q-T INTERVAL: 366 MS
QRS DURATION: 84 MS
QTC CALCULATION (BEZET): 437 MS
R AXIS: 6 DEGREES
T AXIS: 36 DEGREES
VENTRICULAR RATE: 86 BPM

## 2025-01-28 PROCEDURE — 90656 IIV3 VACC NO PRSV 0.5 ML IM: CPT | Performed by: FAMILY MEDICINE

## 2025-01-28 PROCEDURE — 99214 OFFICE O/P EST MOD 30 MIN: CPT | Performed by: FAMILY MEDICINE

## 2025-01-28 PROCEDURE — 90471 IMMUNIZATION ADMIN: CPT | Performed by: FAMILY MEDICINE

## 2025-01-28 PROCEDURE — 93010 ELECTROCARDIOGRAM REPORT: CPT | Performed by: INTERNAL MEDICINE

## 2025-01-28 PROCEDURE — 93005 ELECTROCARDIOGRAM TRACING: CPT

## 2025-01-28 RX ORDER — LOSARTAN POTASSIUM 100 MG/1
100 TABLET ORAL DAILY
Qty: 90 TABLET | Refills: 0 | Status: SHIPPED | OUTPATIENT
Start: 2025-01-28

## 2025-01-28 NOTE — PROGRESS NOTES
HPI:    Patient ID: Eddie Fonseca is a 44 year old male.      Foot Pain   Pertinent negatives include no fever or numbness.       Chief Complaint   Patient presents with    Foot Pain     Bilateral getting worst hurts to walk        Wt Readings from Last 6 Encounters:   01/28/25 229 lb (103.9 kg)   10/21/22 246 lb (111.6 kg)   10/13/22 247 lb (112 kg)   09/29/22 249 lb 3.2 oz (113 kg)   09/27/22 240 lb (108.9 kg)   12/14/21 249 lb (112.9 kg)     BP Readings from Last 3 Encounters:   01/28/25 (!) 182/109   09/25/23 (!) 183/110   10/13/22 (!) 164/101     Lost insurance last year for few months ago  Off all meds.  Had pain in upper abdomen in nov 2024.  Since then bowel movements have changed and also blood in stool.  Stools always loose.  Has bright red blood stool  on and off since  Few drops of blood  Last episode yesterday, with rectal bleeding    No abdominal pain  No fh colon cancer.      Hx of gout.  Feels pain bottom of feet  Worse with shoes on.    Still drinking on weekends/ aprties  Long time since he had gout.          Review of Systems   Constitutional:  Negative for chills, fever and unexpected weight change.   Eyes:  Negative for visual disturbance.   Respiratory:  Negative for cough, shortness of breath and wheezing.    Cardiovascular:  Negative for chest pain, palpitations and leg swelling.   Gastrointestinal:  Positive for anal bleeding, blood in stool and diarrhea. Negative for abdominal pain, constipation, nausea, rectal pain and vomiting.   Genitourinary:  Negative for decreased urine volume, difficulty urinating, dysuria, frequency, hematuria, testicular pain and urgency.   Musculoskeletal:  Positive for arthralgias.   Neurological:  Negative for dizziness, tremors, seizures, weakness, light-headedness, numbness and headaches.       BP (!) 182/109   Pulse 96   Temp 97.4 °F (36.3 °C) (Temporal)   Ht 5' 10\" (1.778 m)   Wt 229 lb (103.9 kg)   BMI 32.86 kg/m²          Current Outpatient  Medications   Medication Sig Dispense Refill    losartan 100 MG Oral Tab Take 1 tablet (100 mg total) by mouth daily. 90 tablet 0     Allergies:Allergies[1]   PHYSICAL EXAM:     Chief Complaint   Patient presents with    Foot Pain     Bilateral getting worst hurts to walk       Physical Exam  Vitals and nursing note reviewed.   Constitutional:       Appearance: He is well-developed.   Eyes:      Pupils: Pupils are equal, round, and reactive to light.   Neck:      Thyroid: No thyromegaly.   Cardiovascular:      Rate and Rhythm: Normal rate and regular rhythm.      Heart sounds: No murmur heard.  Pulmonary:      Effort: Pulmonary effort is normal.      Breath sounds: Normal breath sounds. No wheezing.   Abdominal:      Palpations: There is no mass.      Tenderness: There is no abdominal tenderness. There is no right CVA tenderness or left CVA tenderness.   Musculoskeletal:      Cervical back: Normal range of motion and neck supple.      Right lower leg: No edema.      Left lower leg: No edema.      Comments: Flat feet    Skin:     General: Skin is warm and dry.      Findings: No rash.   Neurological:      Mental Status: He is alert and oriented to person, place, and time.         Bilateral barefoot skin diabetic exam is normal, visualized feet and the appearance is normal.  Bilateral monofilament/sensation of both feet is normal.  Pulsation pedal pulse exam of both lower legs/feet is normal as well.             ASSESSMENT/PLAN:     Encounter Diagnoses   Name Primary?    Rectal bleeding Yes    Change in bowel habits     Foot pain, bilateral     Hyperuricemia     Uncontrolled type 2 diabetes mellitus with hyperglycemia (HCC)     Hypercholesterolemia     Well adult exam     Essential hypertension        1. Rectal bleeding  Check labs  If worsening, to go to ER asap  Follow up GI soon  - [facility] - INTERNAL    2. Change in bowel habits  As above  - [facility] - INTERNAL    3. Foot pain, bilateral  Gout vs neuropathy  Consider  seeing podiatry     4. Hyperuricemia    - Uric Acid    5. Uncontrolled type 2 diabetes mellitus with hyperglycemia (HCC)  No meds  Will discuss further once labs in  - Hemoglobin A1C  - Microalb/Creat Ratio, Random Urine    6. Hypercholesterolemia  Check labs    7. Well adult exam  Rtc for physical  - CBC With Differential With Platelet  - Comp Metabolic Panel (14)  - Lipid Panel  - TSH W Reflex To Free T4  - Urinalysis, Routine    8. Essential hypertension  Uncontrolled  Take medication as directed  Low salt diet (less than 2.5 grams/2500mg's)  Exercise for 30mins most days of the week  Wt loss: 1/2 lb per week: goal 10-15lbs  Buy blood pressure cuff  Take blood pressure daily and keep a log  No caffeine/alcohol  Call if blood pressure greater than 140/90    Call/return with concerns.  Follow up 1-2 weeks  Monitor for headaches/ visual changes   ER if above symptoms  Low salt diet   Avoid any alcohol   - losartan 100 MG Oral Tab; Take 1 tablet (100 mg total) by mouth daily.  Dispense: 90 tablet; Refill: 0      Orders Placed This Encounter   Procedures    CBC With Differential With Platelet    Comp Metabolic Panel (14)    Lipid Panel    TSH W Reflex To Free T4    Urinalysis, Routine    Hemoglobin A1C    Microalb/Creat Ratio, Random Urine    Uric Acid    Fluzone trivalent vaccine, PF 0.5mL, 6mo+ (42681)         The above note was creating using Dragon speech recognition technology. Please excuse any typos    Meds This Visit:  Requested Prescriptions     Signed Prescriptions Disp Refills    losartan 100 MG Oral Tab 90 tablet 0     Sig: Take 1 tablet (100 mg total) by mouth daily.       Imaging & Referrals:  [facility] - INTERNAL  INFLUENZA VACCINE, TRI, PRESERV FREE, 0.5 ML       ID#[id number]       [1]   Allergies  Allergen Reactions    Adhesive Tape RASH

## 2025-01-29 ENCOUNTER — TELEPHONE (OUTPATIENT)
Dept: FAMILY MEDICINE CLINIC | Facility: CLINIC | Age: 45
End: 2025-01-29

## 2025-01-29 DIAGNOSIS — M79.672 FOOT PAIN, BILATERAL: ICD-10-CM

## 2025-01-29 DIAGNOSIS — M79.671 FOOT PAIN, BILATERAL: ICD-10-CM

## 2025-01-29 DIAGNOSIS — E11.65 UNCONTROLLED TYPE 2 DIABETES MELLITUS WITH HYPERGLYCEMIA (HCC): ICD-10-CM

## 2025-01-29 DIAGNOSIS — E79.0 HYPERURICEMIA: Primary | ICD-10-CM

## 2025-01-29 NOTE — TELEPHONE ENCOUNTER
Patient calling ( name and date of birth of patient verified )   Last office visit 1/28/2025    3. Foot pain, bilateral  Gout vs neuropathy  Consider seeing podiatry     Patient was asking if he was going to receive pain medication for his feet ?     Allergies reviewed and pharmacy confirmed    Please advise and thank you.

## 2025-01-30 NOTE — TELEPHONE ENCOUNTER
Spoke to patient, relayed providers message below. Patient agreeable to myChart info on podiatrist as he is working presently.

## 2025-02-02 DIAGNOSIS — E11.65 UNCONTROLLED TYPE 2 DIABETES MELLITUS WITH HYPERGLYCEMIA (HCC): Primary | ICD-10-CM

## 2025-02-02 DIAGNOSIS — E78.00 HYPERCHOLESTEROLEMIA: ICD-10-CM

## 2025-02-02 LAB
ABSOLUTE BASOPHILS: 90 CELLS/UL (ref 0–200)
ABSOLUTE EOSINOPHILS: 123 CELLS/UL (ref 15–500)
ABSOLUTE LYMPHOCYTES: 3100 CELLS/UL (ref 850–3900)
ABSOLUTE MONOCYTES: 656 CELLS/UL (ref 200–950)
ABSOLUTE NEUTROPHILS: 4231 CELLS/UL (ref 1500–7800)
ALBUMIN/GLOBULIN RATIO: 1.4 (CALC) (ref 1–2.5)
ALBUMIN: 4.4 G/DL (ref 3.6–5.1)
ALKALINE PHOSPHATASE: 123 U/L (ref 36–130)
ALT: 24 U/L (ref 9–46)
AST: 21 U/L (ref 10–40)
BASOPHILS: 1.1 %
BILIRUBIN, TOTAL: 0.5 MG/DL (ref 0.2–1.2)
BUN/CREATININE RATIO: 9 (CALC) (ref 6–22)
BUN: 6 MG/DL (ref 7–25)
CALCIUM: 9.6 MG/DL (ref 8.6–10.3)
CARBON DIOXIDE: 28 MMOL/L (ref 20–32)
CHLORIDE: 101 MMOL/L (ref 98–110)
CHOL/HDLC RATIO: 4.1 (CALC)
CHOLESTEROL, TOTAL: 195 MG/DL
CREATININE: 0.7 MG/DL (ref 0.6–1.29)
EGFR: 117 ML/MIN/1.73M2
EOSINOPHILS: 1.5 %
GLOBULIN: 3.2 G/DL (CALC) (ref 1.9–3.7)
GLUCOSE: 221 MG/DL (ref 65–99)
HDL CHOLESTEROL: 47 MG/DL
HEMATOCRIT: 52.1 % (ref 38.5–50)
HEMOGLOBIN A1C: 9.3 % OF TOTAL HGB
HEMOGLOBIN: 17.3 G/DL (ref 13.2–17.1)
LDL-CHOLESTEROL: 131 MG/DL (CALC)
LYMPHOCYTES: 37.8 %
MCH: 30.3 PG (ref 27–33)
MCHC: 33.2 G/DL (ref 32–36)
MCV: 91.2 FL (ref 80–100)
MONOCYTES: 8 %
MPV: 12.4 FL (ref 7.5–12.5)
NEUTROPHILS: 51.6 %
NON-HDL CHOLESTEROL: 148 MG/DL (CALC)
PLATELET COUNT: 286 THOUSAND/UL (ref 140–400)
POTASSIUM: 4.3 MMOL/L (ref 3.5–5.3)
PROTEIN, TOTAL: 7.6 G/DL (ref 6.1–8.1)
RDW: 11.4 % (ref 11–15)
RED BLOOD CELL COUNT: 5.71 MILLION/UL (ref 4.2–5.8)
SODIUM: 138 MMOL/L (ref 135–146)
TRIGLYCERIDES: 73 MG/DL
TSH W/REFLEX TO FT4: 1.63 MIU/L (ref 0.4–4.5)
URIC ACID: 4.7 MG/DL (ref 4–8)
WHITE BLOOD CELL COUNT: 8.2 THOUSAND/UL (ref 3.8–10.8)

## 2025-02-02 RX ORDER — ROSUVASTATIN CALCIUM 5 MG/1
5 TABLET, COATED ORAL NIGHTLY
Qty: 90 TABLET | Refills: 3 | Status: SHIPPED | OUTPATIENT
Start: 2025-02-02

## 2025-02-02 RX ORDER — METFORMIN HYDROCHLORIDE 750 MG/1
750 TABLET, EXTENDED RELEASE ORAL 2 TIMES DAILY WITH MEALS
Qty: 180 TABLET | Refills: 0 | Status: SHIPPED | OUTPATIENT
Start: 2025-02-02 | End: 2025-05-03

## 2025-02-06 LAB
APPEARANCE: CLEAR
BILIRUBIN: NEGATIVE
COLOR: YELLOW
CREATININE, RANDOM URINE: 74 MG/DL (ref 20–320)
KETONES: NEGATIVE
LEUKOCYTE ESTERASE: NEGATIVE
MICROALBUMIN/CREATININE RATIO, RANDOM URINE: 11 MG/G CREAT
MICROALBUMIN: 0.8 MG/DL
NITRITE: NEGATIVE
OCCULT BLOOD: NEGATIVE
PH: 6.5 (ref 5–8)
PROTEIN: NEGATIVE
SPECIFIC GRAVITY: 1.01 (ref 1–1.03)

## 2025-02-07 ENCOUNTER — OFFICE VISIT (OUTPATIENT)
Facility: CLINIC | Age: 45
End: 2025-02-07
Payer: COMMERCIAL

## 2025-02-07 ENCOUNTER — TELEPHONE (OUTPATIENT)
Facility: CLINIC | Age: 45
End: 2025-02-07

## 2025-02-07 VITALS
HEART RATE: 112 BPM | BODY MASS INDEX: 32.54 KG/M2 | WEIGHT: 227.31 LBS | DIASTOLIC BLOOD PRESSURE: 79 MMHG | HEIGHT: 70 IN | SYSTOLIC BLOOD PRESSURE: 135 MMHG

## 2025-02-07 DIAGNOSIS — K62.5 RECTAL BLEEDING: ICD-10-CM

## 2025-02-07 DIAGNOSIS — R19.7 DIARRHEA, UNSPECIFIED TYPE: Primary | ICD-10-CM

## 2025-02-07 DIAGNOSIS — K62.5 RECTAL BLEEDING: Primary | ICD-10-CM

## 2025-02-07 DIAGNOSIS — R19.7 DIARRHEA, UNSPECIFIED TYPE: ICD-10-CM

## 2025-02-07 PROCEDURE — 99204 OFFICE O/P NEW MOD 45 MIN: CPT | Performed by: NURSE PRACTITIONER

## 2025-02-07 NOTE — H&P
[facility] - Gastroenterology                                                                                                               Reason for consult: change in bowel habits    Requesting physician or provider: Ger St MD    Chief Complaint   Patient presents with    Consult     For abdominal pain.change in bowel habits,blood in stool       HPI:   Eddie Fonseca is a 44 year old year-old male with medical history of uncontrolled diabetes, hypertension, gout, hyperlipidemia.     he is here today for evaluation of change in bowel habits and blood in stool.  In October 2024 had one day of severe abdominal pain.   Pain resolved but since having the pain has had watery/bloody diarrhea multiple times a day.  Was not on any medications. No recent antibiotic use.   Taking daily Motrin for foot pain.   No anemia on bloodwork.    Last colonoscopy: none  Last EGD: none    NSAIDS: Motrin daily  Tobacco: none  Alcohol: social  Marijuana: none  Illicit drugs: none    FH GI malignancy: none  FH IBD: none    No history of adverse reaction to sedation  No BOZENA  No anticoagulants/antiplatelet  No pacemaker/defibrillator    Wt Readings from Last 6 Encounters:   02/07/25 227 lb 4.8 oz (103.1 kg)   01/28/25 229 lb (103.9 kg)   10/21/22 246 lb (111.6 kg)   10/13/22 247 lb (112 kg)   09/29/22 249 lb 3.2 oz (113 kg)   09/27/22 240 lb (108.9 kg)        History, Medications, Allergies, ROS:      Past Medical History:    Diabetes (HCC)    Essential hypertension    Gout    X 2      History reviewed. No pertinent surgical history.   Family Hx:   Family History   Problem Relation Age of Onset    Hypertension Maternal Grandmother     Heart Disorder Paternal Grandmother         Cardiomyopathy    Other (Other) Other         lupus, mat aunt      Social History:   Social History     Socioeconomic History    Marital status: Single   Tobacco Use     Smoking status: Never    Smokeless tobacco: Never   Vaping Use    Vaping status: Never Used   Substance and Sexual Activity    Alcohol use: Yes     Alcohol/week: 0.0 standard drinks of alcohol     Comment: Beer, 2 beers weekly, last drink last night    Drug use: Yes     Types: Cannabis   Other Topics Concern    Caffeine Concern No    Reaction to local anesthetic No        Medications (Active prior to today's visit):  Current Outpatient Medications   Medication Sig Dispense Refill    polyethylene glycol, PEG 3350-KCl-NaBcb-NaCl-NaSulf, 236 g Oral Recon Soln Take 4,000 mL by mouth As Directed. Take 2,000 mL the night before your procedure and 2,000 mL the morning of your procedure. 1 each 0    metFORMIN  MG Oral Tablet 24 Hr Take 1 tablet (750 mg total) by mouth 2 (two) times daily with meals. 180 tablet 0    rosuvastatin (CRESTOR) 5 MG Oral Tab Take 1 tablet (5 mg total) by mouth nightly. 90 tablet 3    losartan 100 MG Oral Tab Take 1 tablet (100 mg total) by mouth daily. 90 tablet 0       Allergies:  Allergies[1]    ROS:   CONSTITUTIONAL: negative for fevers, chills, sweats  EYES Negative for scleral icterus or redness, and diplopia  HEENT: Negative for hoarseness  RESPIRATORY: Negative for cough and severe shortness of breath  CARDIOVASCULAR: Negative for crushing sub-sternal chest pain  GASTROINTESTINAL: See HPI  GENITOURINARY: Negative for dysuria  MUSCULOSKELETAL: Negative for arthralgias and myalgias  SKIN: Negative for jaundice, rash or pruritus  NEUROLOGICAL: Negative for dizziness and headaches  BEHAVIOR/PSYCH: Negative for psychotic behavior    PHYSICAL EXAM:   Blood pressure 135/79, pulse 112, height 5' 10\" (1.778 m), weight 227 lb 4.8 oz (103.1 kg).    GEN: Alert, no acute distress, well-nourished   ABDOMEN: Soft, symmetrical, non-tender without distention or guarding. No scars or lesions. Aorta is without bruit or visible pulsation. Umbilicus is midline without herniation. Normoactive bowel  sounds are present, No masses, hepatomegaly or splenomegaly noted.  MSK: No erythema, no warmth, no swelling of joints  SKIN: No jaundice, no erythema, no rashes, no lesions  HEMATOLOGIC: No bleeding, no bruising  NEURO: Alert and interactive, CASAS  PSYCH: appropriate mood & affect    Labs/Imaging/Procedures:     Patient's pertinent labs and imaging were reviewed and discussed with patient today.        .  ASSESSMENT/PLAN:   Eddie Fonseca is a 44 year old year-old male with medical history of uncontrolled diabetes, hypertension, gout, hyperlipidemia.     1. Diarrhea, unspecified type  - H. Pylori Stool Ag, EIA  - C. diff toxigenic PCR (OPT); Future  - Stool Culture W/Shigatoxin; Future  - Giardia + Crypto Antigen, Stool; Future  - Calprotectin, Fecal  - polyethylene glycol, PEG 3350-KCl-NaBcb-NaCl-NaSulf, 236 g Oral Recon Soln; Take 4,000 mL by mouth As Directed. Take 2,000 mL the night before your procedure and 2,000 mL the morning of your procedure.  Dispense: 1 each; Refill: 0    2. Rectal bleeding     Complete stool testing to rule out infectious diarrhea.  Schedule colonoscopy.  Advised to stop NSAID use.      Patient Instructions   1. Schedule colonoscopy with General Pool Endoscopist - urgent  Diagnosis: rectal bleeding, diarrhea   Sedation: MAC  Prep: split dose golytely    2. MEDICATION CHANGES PRIOR TO PROCEDURE    *HOLD metformin day before & day of procedure    DO NOT TAKE: Any form of alcohol, recreational drugs and any forms of erectile dysfunction medications 24 hours prior to procedure.    3.  bowel prep from pharmacy   You can pick the bowel prep up now and store in a cool, dry place in your home until your scheduled bowel prep start date.    4. Read all bowel prep instructions carefully. Bowel prep instructions can also be found online at:  www.[facility].org/giprep     5. AVOID seeds, nuts, popcorn, raw fruits and vegetables for 5 days before procedure    6. If you start any NEW medication  after your visit today, please notify us. Certain medications (like iron or weight loss medications) will need to be held before the procedure, or the procedure cannot be performed safely.       Orders This Visit:  Orders Placed This Encounter   Procedures    H. Pylori Stool Ag, EIA    Calprotectin, Fecal    C. diff toxigenic PCR (OPT)    Stool Culture W/Shigatoxin    Giardia + Crypto Antigen, Stool       Meds This Visit:  Requested Prescriptions     Signed Prescriptions Disp Refills    polyethylene glycol, PEG 3350-KCl-NaBcb-NaCl-NaSulf, 236 g Oral Recon Soln 1 each 0     Sig: Take 4,000 mL by mouth As Directed. Take 2,000 mL the night before your procedure and 2,000 mL the morning of your procedure.       Imaging & Referrals:  None      LIZZY Dodge    [facility] Gastroenterology  2/7/2025               [1]   Allergies  Allergen Reactions    Adhesive Tape RASH

## 2025-02-07 NOTE — TELEPHONE ENCOUNTER
Scheduled for:  Colonoscopy 39968  Provider Name:  Dr. Nix   Date:  5/1/2025  Location:[facility]  Sedation:  MAC  Time: (pt is aware that [facility] will call the day before to confirm arrival time)    Prep:  Golytely  Meds/Allergies Reconciled?:    LIZZY Dodge  Diagnosis with codes:    Rectal Bleeding K62.5  Diarrhea R19.7   Was patient informed to call insurance with codes (Y/N):  Yes  Referral sent?:  Referral was sent at the time of electronic surgical scheduling.  EM or [facility] notified?:  I sent an electronic request to Endo Scheduling and received a confirmation today.  Medication Orders:    *HOLD metformin day before & day of procedure         Pt is aware to NOT take iron pills, herbal meds and diet supplements for 7 days before exam. Also to NOT take any form of alcohol, recreational drugs and any forms of ED meds 24 hours before exam.   Misc Orders:       Further instructions given by staff:  I provide prep instructions to patient at the time of the appointment and reviewed date, time and location, he verbalized that he understood and is aware to call if he has any questions.

## 2025-02-07 NOTE — PATIENT INSTRUCTIONS
1. Schedule colonoscopy with General Pool Endoscopist - urgent  Diagnosis: rectal bleeding, diarrhea   Sedation: MAC  Prep: split dose golytely    2. MEDICATION CHANGES PRIOR TO PROCEDURE    *HOLD metformin day before & day of procedure    DO NOT TAKE: Any form of alcohol, recreational drugs and any forms of erectile dysfunction medications 24 hours prior to procedure.    3.  bowel prep from pharmacy   You can pick the bowel prep up now and store in a cool, dry place in your home until your scheduled bowel prep start date.    4. Read all bowel prep instructions carefully. Bowel prep instructions can also be found online at:  www.[facility].org/giprep     5. AVOID seeds, nuts, popcorn, raw fruits and vegetables for 5 days before procedure    6. If you start any NEW medication after your visit today, please notify us. Certain medications (like iron or weight loss medications) will need to be held before the procedure, or the procedure cannot be performed safely.

## 2025-02-14 ENCOUNTER — OFFICE VISIT (OUTPATIENT)
Dept: PODIATRY CLINIC | Facility: CLINIC | Age: 45
End: 2025-02-14

## 2025-02-14 DIAGNOSIS — E11.42 TYPE 2 DIABETES MELLITUS WITH DIABETIC POLYNEUROPATHY, WITHOUT LONG-TERM CURRENT USE OF INSULIN (HCC): Primary | ICD-10-CM

## 2025-02-14 PROCEDURE — 99204 OFFICE O/P NEW MOD 45 MIN: CPT | Performed by: STUDENT IN AN ORGANIZED HEALTH CARE EDUCATION/TRAINING PROGRAM

## 2025-02-14 NOTE — PROGRESS NOTES
[facility] Podiatry  Progress Note      Eddie Fonseca is a 44 year old male.   Chief Complaint   Patient presents with    Diabetic Foot Care     Consult diabetic foot care, on 2/1/25 A1C= 9.3, he did not BS this am. Bilateral foot pain 9/10, onset of pain about 4 months, declines injury.  On 1/28/2025 LOV with PCP Ger Nix MD.             HPI:     Patient is a pleasant 44-year-old diabetic male presents to clinic for bilateral foot evaluation.  Patient admits to bilateral lower extremity numbness, tingling and pins-and-needles.  Patient relates that he is working on lowering his A1c.  Denies any pedal injuries or trauma.    Allergies: Adhesive tape    Current Outpatient Medications   Medication Sig Dispense Refill    metFORMIN  MG Oral Tablet 24 Hr Take 1 tablet (750 mg total) by mouth 2 (two) times daily with meals. 180 tablet 0    rosuvastatin (CRESTOR) 5 MG Oral Tab Take 1 tablet (5 mg total) by mouth nightly. 90 tablet 3    losartan 100 MG Oral Tab Take 1 tablet (100 mg total) by mouth daily. 90 tablet 0    polyethylene glycol, PEG 3350-KCl-NaBcb-NaCl-NaSulf, 236 g Oral Recon Soln Take 4,000 mL by mouth As Directed. Take 2,000 mL the night before your procedure and 2,000 mL the morning of your procedure. (Patient not taking: Reported on 2/14/2025) 1 each 0      Past Medical History:    Diabetes (HCC)    Essential hypertension    Gout    X 2      History reviewed. No pertinent surgical history.   Family History   Problem Relation Age of Onset    Hypertension Maternal Grandmother     Heart Disorder Paternal Grandmother         Cardiomyopathy    Other (Other) Other         lupus, mat aunt      Social History     Socioeconomic History    Marital status: Single   Tobacco Use    Smoking status: Never    Smokeless tobacco: Never   Vaping Use    Vaping status: Never Used   Substance and Sexual Activity    Alcohol use: Yes     Alcohol/week: 0.0 standard drinks of alcohol     Comment: Beer, 2 beers  weekly, last drink last night    Drug use: Yes     Types: Cannabis   Other Topics Concern    Caffeine Concern No    Reaction to local anesthetic No           REVIEW OF SYSTEMS:     Denies nause, fever, chills  No calf pain  Denies chest pain or SOB      EXAM:   There were no vitals taken for this visit.  GENERAL: well developed, well nourished, in no apparent distress  EXTREMITIES:   1. Integument: Normal skin temperature and turgor  2. Vascular: Dorsalis pedis two out of four bilateral and posterior tibial pulses two out of   four bilateral, capillary refill normal.   3. Musculoskeletal: All muscle groups are graded 5 out of 5 in the foot and ankle.   4. Neurological: Normal sharp dull sensation; reflexes normal.             ASSESSMENT AND PLAN:   Diagnoses and all orders for this visit:    Type 2 diabetes mellitus with diabetic polyneuropathy, without long-term current use of insulin (HCC)        Plan:       -Patient examined, chart history reviewed.  -Discussed importance of proper pedal hygiene, regular foot checks, and tight glucose control.  -Recommend supplementing with vitamin B complex over-the-counter as directed.  -Referral placed for nutritionist.  -Discussed with primary care doctor on starting a low-dose of gabapentin to help with neuropathy symptoms  -Ambulate with supportive shoes and inserts and avoid walking barefoot.  -Educated patient on acute signs of infection advised patient to seek immediate medical attention if symptoms arise.    RTC in 1 year      The patient indicates understanding of these issues and agrees to the plan.        Jenna York DPM

## 2025-02-19 ENCOUNTER — OFFICE VISIT (OUTPATIENT)
Dept: FAMILY MEDICINE CLINIC | Facility: CLINIC | Age: 45
End: 2025-02-19
Payer: COMMERCIAL

## 2025-02-19 VITALS
HEIGHT: 70 IN | SYSTOLIC BLOOD PRESSURE: 150 MMHG | WEIGHT: 225 LBS | BODY MASS INDEX: 32.21 KG/M2 | DIASTOLIC BLOOD PRESSURE: 90 MMHG | HEART RATE: 101 BPM

## 2025-02-19 DIAGNOSIS — I10 ESSENTIAL HYPERTENSION: ICD-10-CM

## 2025-02-19 DIAGNOSIS — M79.671 FOOT PAIN, BILATERAL: ICD-10-CM

## 2025-02-19 DIAGNOSIS — E11.42 DIABETIC POLYNEUROPATHY ASSOCIATED WITH TYPE 2 DIABETES MELLITUS (HCC): ICD-10-CM

## 2025-02-19 DIAGNOSIS — M79.672 FOOT PAIN, BILATERAL: ICD-10-CM

## 2025-02-19 DIAGNOSIS — Z23 NEED FOR TDAP VACCINATION: ICD-10-CM

## 2025-02-19 DIAGNOSIS — E11.65 UNCONTROLLED TYPE 2 DIABETES MELLITUS WITH HYPERGLYCEMIA (HCC): ICD-10-CM

## 2025-02-19 DIAGNOSIS — D58.2 ELEVATED HEMOGLOBIN: ICD-10-CM

## 2025-02-19 DIAGNOSIS — E79.0 HYPERURICEMIA: ICD-10-CM

## 2025-02-19 DIAGNOSIS — E78.00 HYPERCHOLESTEROLEMIA: ICD-10-CM

## 2025-02-19 DIAGNOSIS — Z00.00 WELL ADULT EXAM: Primary | ICD-10-CM

## 2025-02-19 PROCEDURE — 90471 IMMUNIZATION ADMIN: CPT | Performed by: FAMILY MEDICINE

## 2025-02-19 PROCEDURE — 90715 TDAP VACCINE 7 YRS/> IM: CPT | Performed by: FAMILY MEDICINE

## 2025-02-19 PROCEDURE — 99213 OFFICE O/P EST LOW 20 MIN: CPT | Performed by: FAMILY MEDICINE

## 2025-02-19 PROCEDURE — 99396 PREV VISIT EST AGE 40-64: CPT | Performed by: FAMILY MEDICINE

## 2025-02-19 RX ORDER — DULOXETIN HYDROCHLORIDE 30 MG/1
30 CAPSULE, DELAYED RELEASE ORAL 2 TIMES DAILY
Qty: 60 CAPSULE | Refills: 0 | Status: SHIPPED | OUTPATIENT
Start: 2025-02-19 | End: 2025-03-21

## 2025-02-19 NOTE — PROGRESS NOTES
HPI:    Patient ID: Eddie Fonseca is a 44 year old male.    HPI  Chief Complaint   Patient presents with    Routine Physical       Wt Readings from Last 6 Encounters:   02/19/25 225 lb (102.1 kg)   02/07/25 227 lb 4.8 oz (103.1 kg)   01/28/25 229 lb (103.9 kg)   10/21/22 246 lb (111.6 kg)   10/13/22 247 lb (112 kg)   09/29/22 249 lb 3.2 oz (113 kg)     BP Readings from Last 3 Encounters:   02/19/25 150/90   02/07/25 135/79   01/28/25 (!) 182/109     Having feet pain - pins and needles. Hard to sleep sometimes.  Saw podiatry  2/14/25- she diagnosed him with diabetic neuropathy     Review of Systems   Constitutional:  Negative for activity change, appetite change, chills, fatigue, fever and unexpected weight change.   HENT:  Negative for congestion, ear pain, nosebleeds, postnasal drip, rhinorrhea, sinus pressure, sinus pain, sneezing, sore throat, tinnitus, trouble swallowing and voice change.    Eyes:  Negative for visual disturbance.   Respiratory:  Negative for cough, chest tightness, shortness of breath and wheezing.    Cardiovascular:  Negative for chest pain, palpitations and leg swelling.   Gastrointestinal:  Negative for abdominal pain, blood in stool, constipation, diarrhea, nausea, rectal pain and vomiting.   Endocrine: Negative for cold intolerance, heat intolerance, polydipsia, polyphagia and polyuria.   Genitourinary:  Negative for decreased urine volume, difficulty urinating, dysuria, flank pain, frequency, genital sores, hematuria, penile discharge, penile pain, penile swelling, scrotal swelling, testicular pain and urgency.   Musculoskeletal:  Negative for arthralgias, back pain and myalgias.        Feet pain bilateral   Skin:  Negative for rash.   Neurological:  Negative for dizziness, seizures, syncope, speech difficulty, weakness, light-headedness, numbness and headaches.   Psychiatric/Behavioral:  Negative for behavioral problems, decreased concentration, self-injury, sleep disturbance and  suicidal ideas. The patient is not nervous/anxious.        /90   Pulse 101   Ht 5' 10\" (1.778 m)   Wt 225 lb (102.1 kg)   BMI 32.28 kg/m²     Past Medical History:    Diabetes (HCC)    Essential hypertension    Gout    X 2     History reviewed. No pertinent surgical history.  Social History     Socioeconomic History    Marital status: Single     Spouse name: Not on file    Number of children: Not on file    Years of education: Not on file    Highest education level: Not on file   Occupational History    Not on file   Tobacco Use    Smoking status: Never    Smokeless tobacco: Never   Vaping Use    Vaping status: Never Used   Substance and Sexual Activity    Alcohol use: Yes     Alcohol/week: 0.0 standard drinks of alcohol     Comment: Beer, 2 beers weekly, last drink last night    Drug use: Yes     Types: Cannabis    Sexual activity: Not on file   Other Topics Concern     Service Not Asked    Blood Transfusions Not Asked    Caffeine Concern No    Occupational Exposure Not Asked    Hobby Hazards Not Asked    Sleep Concern Not Asked    Stress Concern Not Asked    Weight Concern Not Asked    Special Diet Not Asked    Back Care Not Asked    Exercise Not Asked    Bike Helmet Not Asked    Seat Belt Not Asked    Self-Exams Not Asked    Grew up on a farm Not Asked    History of tanning Not Asked    Outdoor occupation Not Asked    Reaction to local anesthetic No   Social History Narrative    Not on file     Social Drivers of Health     Food Insecurity: Not on file   Transportation Needs: Not on file   Stress: Not on file   Housing Stability: Not on file     Family History   Problem Relation Age of Onset    Hypertension Maternal Grandmother     Heart Disorder Paternal Grandmother         Cardiomyopathy    Other (Other) Other         lupus, mat aunt       Immunization History   Administered Date(s) Administered    Covid-19 Vaccine Pfizer 30 mcg/0.3 ml 03/27/2021, 04/17/2021, 11/30/2021    FLULAVAL 6 months &  older 0.5 ml Prefilled syringe (80406) 10/15/2020, 12/14/2021, 10/13/2022    FLUZONE 6 months and older PFS 0.5 ml (31728) 10/15/2020, 12/14/2021    HEP A 07/15/2013    Influenza Vaccine, trivalent (IIV3), PF 0.5mL (77663) 01/28/2025    Pneumococcal Conjugate PCV20 10/13/2022    TDAP 03/18/2013       Health Maintenance   Topic Date Due    Diabetes Care Dilated Eye Exam  Never done    Annual Physical  Never done    DTaP,Tdap,and Td Vaccines (2 - Td or Tdap) 03/18/2023    COVID-19 Vaccine (4 - 2024-25 season) 09/01/2024    Diabetes Care A1C  05/01/2025    Diabetes Care: GFR  02/01/2026    Influenza Vaccine  Completed    Annual Depression Screening  Completed    Diabetes Care: Foot Exam (Annual)  Completed    Diabetes Care: Microalb/Creat Ratio (Annual)  Completed    Pneumococcal Vaccine: Birth to 50yrs  Completed    Meningococcal B Vaccine  Aged Out          Current Outpatient Medications   Medication Sig Dispense Refill    DULoxetine (CYMBALTA) 30 MG Oral Cap DR Particles Take 1 capsule (30 mg total) by mouth 2 (two) times daily. 60 capsule 0    metFORMIN  MG Oral Tablet 24 Hr Take 1 tablet (750 mg total) by mouth 2 (two) times daily with meals. 180 tablet 0    rosuvastatin (CRESTOR) 5 MG Oral Tab Take 1 tablet (5 mg total) by mouth nightly. 90 tablet 3    losartan 100 MG Oral Tab Take 1 tablet (100 mg total) by mouth daily. 90 tablet 0    polyethylene glycol, PEG 3350-KCl-NaBcb-NaCl-NaSulf, 236 g Oral Recon Soln Take 4,000 mL by mouth As Directed. Take 2,000 mL the night before your procedure and 2,000 mL the morning of your procedure. (Patient not taking: Reported on 2/19/2025) 1 each 0     Allergies:Allergies[1]   PHYSICAL EXAM:     Chief Complaint   Patient presents with    Routine Physical      Physical Exam  Vitals and nursing note reviewed.   Constitutional:       Appearance: He is well-developed.   HENT:      Head: Normocephalic and atraumatic.      Right Ear: External ear normal.      Left Ear:  External ear normal.      Nose: Nose normal.      Mouth/Throat:      Pharynx: No oropharyngeal exudate.   Eyes:      General:         Right eye: No discharge.         Left eye: No discharge.      Conjunctiva/sclera: Conjunctivae normal.      Pupils: Pupils are equal, round, and reactive to light.   Neck:      Thyroid: No thyromegaly.   Cardiovascular:      Rate and Rhythm: Normal rate and regular rhythm.      Heart sounds: Normal heart sounds. No murmur heard.  Pulmonary:      Effort: Pulmonary effort is normal.      Breath sounds: Normal breath sounds. No wheezing.   Abdominal:      General: Bowel sounds are normal.      Palpations: Abdomen is soft. There is no mass.      Tenderness: There is no abdominal tenderness.   Musculoskeletal:         General: No tenderness.      Cervical back: Normal range of motion and neck supple.   Lymphadenopathy:      Cervical: No cervical adenopathy.   Skin:     General: Skin is dry.      Findings: No rash.   Neurological:      Mental Status: He is alert and oriented to person, place, and time.      Cranial Nerves: No cranial nerve deficit.      Motor: No abnormal muscle tone.      Coordination: Coordination normal.      Deep Tendon Reflexes: Reflexes are normal and symmetric. Reflexes normal.   Psychiatric:         Behavior: Behavior normal.         Thought Content: Thought content normal.         Judgment: Judgment normal.                ASSESSMENT/PLAN:     Return yearly for physicals  Follow up with dentist every 6 months  Follow up with eye doctor yearly  Recommend aerobic exercise for at least 30mins 5 days a week  Yearly flu shot  Tetanus booster every 10 years (Tdap/ Td)  Labs ordered/ or reviewed if done prior to appointment     Encounter Diagnoses   Name Primary?    Well adult exam Yes    Essential hypertension     Hypercholesterolemia     Uncontrolled type 2 diabetes mellitus with hyperglycemia (HCC)     Hyperuricemia     Foot pain, bilateral     Need for Tdap vaccination      Diabetic polyneuropathy associated with type 2 diabetes mellitus (HCC)     Elevated hemoglobin          1. Well adult exam  Labs reviewed    2. Essential hypertension  Bps elevated  Continue low salt diet  Check bps at home and send me readings 2 weeks   Continue present management     3. Hypercholesterolemia  Continue present management  Patient just started crestor recently.  Will recheck cholesterol 3 months  Low fat low cholesterol diet    4. Uncontrolled type 2 diabetes mellitus with hyperglycemia (HCC)  Just resumed metformin  Continue present management   Recheck Diabetes mellitus 3 months   - Ophthalmology Referral - In Network    5. Hyperuricemia  Has improved significantly  Patient has cut back on alcohol    6. Foot pain, bilateral  Neuropathy  Discussed meds/ options  Will try cymbalta   Fu 1 month    7. Need for Tdap vaccination    - TETANUS, DIPHTHERIA TOXOIDS AND ACELLULAR PERTUSIS VACCINE (TDAP), >7 YEARS, IM USE    8. Diabetic polyneuropathy associated with type 2 diabetes mellitus (HCC)  Take medications as directed. Side effects/ risks discussed and patient verbalized understanding of plan. To follow up if symptoms worsen.    - DULoxetine (CYMBALTA) 30 MG Oral Cap DR Particles; Take 1 capsule (30 mg total) by mouth 2 (two) times daily.  Dispense: 60 capsule; Refill: 0    9. Elevated hemoglobin  Increase water intake  Recheck 1 month  If remains elevated consider seeing hematology   - CBC With Differential With Platelet      Orders Placed This Encounter   Procedures    CBC With Differential With Platelet    TETANUS, DIPHTHERIA TOXOIDS AND ACELLULAR PERTUSIS VACCINE (TDAP), >7 YEARS, IM USE       The above note was creating using Dragon speech recognition technology. Please excuse any typos    Meds This Visit:  Requested Prescriptions     Signed Prescriptions Disp Refills    DULoxetine (CYMBALTA) 30 MG Oral Cap DR Particles 60 capsule 0     Sig: Take 1 capsule (30 mg total) by mouth 2 (two) times  daily.       Imaging & Referrals:  TETANUS, DIPHTHERIA TOXOIDS AND ACELLULAR PERTUSIS VACCINE (TDAP), >7 YEARS, IM USE  OPHTHALMOLOGY - INTERNAL       ID#[id number]       [1]   Allergies  Allergen Reactions    Adhesive Tape RASH

## 2025-03-19 DIAGNOSIS — E11.42 DIABETIC POLYNEUROPATHY ASSOCIATED WITH TYPE 2 DIABETES MELLITUS (HCC): ICD-10-CM

## 2025-03-24 RX ORDER — DULOXETIN HYDROCHLORIDE 30 MG/1
30 CAPSULE, DELAYED RELEASE ORAL 2 TIMES DAILY
Qty: 180 CAPSULE | Refills: 3 | Status: SHIPPED | OUTPATIENT
Start: 2025-03-24

## 2025-03-24 NOTE — TELEPHONE ENCOUNTER
Written for Quantity: 60 with Refills: 0 is refill appropriate, Medication refill pended for your review/approval

## 2025-03-29 DIAGNOSIS — I10 ESSENTIAL HYPERTENSION: ICD-10-CM

## 2025-04-02 ENCOUNTER — TELEPHONE (OUTPATIENT)
Facility: CLINIC | Age: 45
End: 2025-04-02

## 2025-04-02 RX ORDER — LOSARTAN POTASSIUM 100 MG/1
100 TABLET ORAL DAILY
Qty: 90 TABLET | Refills: 0 | Status: SHIPPED | OUTPATIENT
Start: 2025-04-02

## 2025-04-02 NOTE — TELEPHONE ENCOUNTER
Protocol Failed/ No Protocol    Requested Prescriptions   Pending Prescriptions Disp Refills    LOSARTAN 100 MG Oral Tab [Pharmacy Med Name: LOSARTAN 100MG TABLETS] 90 tablet 0     Sig: TAKE 1 TABLET(100 MG) BY MOUTH DAILY       Hypertension Medications Protocol Failed - 4/1/2025 10:34 PM        Failed - Last BP reading less than 140/90     BP Readings from Last 1 Encounters:   02/19/25 150/90               Passed - CMP or BMP in past 12 months        Passed - In person appointment or virtual visit in the past 12 mos or appointment in next 3 mos     Recent Outpatient Visits              1 month ago Well adult exam    [facility], [facility]    Office Visit    1 month ago Type 2 diabetes mellitus with diabetic polyneuropathy, without long-term current use of insulin (HCC)    [facility]    Office Visit    1 month ago Diarrhea, unspecified type    [place]    Office Visit    2 months ago Rectal bleeding    [facility]    Office Visit    1 year ago Rash of penis    [facility]    Telemedicine          Future Appointments         Provider Department Appt Notes    In 1 month JAMES ARMENTA [place] colonoscopy with mac @ [facility]                    Passed - EGFRCR or GFRNAA > 50     GFR Evaluation  EGFRCR: 117 , resulted on 2/1/2025          Passed - Medication is active on med list             Future Appointments         Provider Department Appt Notes    In 1 month [facility]          Recent Outpatient Visits              1 month ago Well adult exam    [facility],  [facility] Simi Angel Asma M, MD    Office Visit    1 month ago Type 2 diabetes mellitus with diabetic polyneuropathy, without long-term current use of insulin (HCC)    [facility], [facility], Jenna Carballo DPM    Office Visit    1 month ago Diarrhea, unspecified type    [facility], [facility], Linda Aburto APRN    Office Visit    2 months ago Rectal bleeding    [facility]    Office Visit    1 year ago Rash of penis    [facility]    Telemedicine

## 2025-04-02 NOTE — TELEPHONE ENCOUNTER
4/2/2025, 1st reminder letter mailed out to patient Via my chart      Labs ordered:   Calprotectin, Fecal (Order #[id number]) on 2/7/25     Giardia + Crypto Antigen, Stool (Order #[id number]) on 2/7/25     Stool Culture W/Shigatoxin (Order #[id number]) on 2/7/25     C. diff toxigenic PCR (OPT) (Order #[id number]) on 2/7/25     H. Pylori Stool Ag, EIA (Order #[id number]) on 2/7/25

## 2025-05-01 DIAGNOSIS — E78.00 HYPERCHOLESTEROLEMIA: ICD-10-CM

## 2025-05-01 DIAGNOSIS — I10 ESSENTIAL HYPERTENSION: ICD-10-CM

## 2025-05-01 DIAGNOSIS — E11.65 UNCONTROLLED TYPE 2 DIABETES MELLITUS WITH HYPERGLYCEMIA (HCC): ICD-10-CM

## 2025-05-01 RX ORDER — ROSUVASTATIN CALCIUM 5 MG/1
5 TABLET, COATED ORAL NIGHTLY
Qty: 90 TABLET | Refills: 3 | OUTPATIENT
Start: 2025-05-01

## 2025-05-01 RX ORDER — METFORMIN HYDROCHLORIDE 750 MG/1
750 TABLET, EXTENDED RELEASE ORAL 2 TIMES DAILY WITH MEALS
Qty: 180 TABLET | Refills: 0 | Status: SHIPPED | OUTPATIENT
Start: 2025-05-01 | End: 2025-05-05

## 2025-05-01 RX ORDER — LOSARTAN POTASSIUM 100 MG/1
100 TABLET ORAL DAILY
Qty: 90 TABLET | Refills: 0 | Status: SHIPPED | OUTPATIENT
Start: 2025-05-01 | End: 2025-05-05

## 2025-05-01 NOTE — TELEPHONE ENCOUNTER
Patient has a blood pressure follow up appointment on 5/5/2025    Patient was seen on 2/19/2025 with Dr. St for an annual physical exam.    Per 2/1/25 labs:  Written by Ger St MD on 2/10/2025 10:37 PM CST   Labs show that your diabetes is still very uncontrolled.  I would recommend resuming metformin e r 850 mg twice daily    I will discuss further at your appointment and also to recheck your blood pressure cholesterol levels are elevated and you need to be on a cholesterol-lowering medication.  I will send this medication that you take 1 pill daily at bedtime this will help decrease your risk of Heart disease and stroke

## 2025-05-01 NOTE — TELEPHONE ENCOUNTER
Per wife of patient she will call back the pharmacy of patient but per wife of patient, patient Metformin he is out due to Dr St gave him only 3 months supply last 02/02/2025 and it is already 05/2025.

## 2025-05-01 NOTE — TELEPHONE ENCOUNTER
Please call patient back -    Patient has refills for all 3 medication at the [facility].    **To avoid delays with pt's receiving their medication(s), especially high priority requests - Please remember prior to sending refill requests, verify with patient if they have contacted their pharmacy first to see if refills are available. Thank you**    Medication Quantity Refills Start End   losartan 100 MG Oral Tab 90 tablet 0 4/2/2025 --   Sig:   Take 1 tablet (100 mg total) by mouth daily.     Route:   Oral     E-Prescribing Status: Receipt confirmed by pharmacy (4/2/2025 12:20 PM CDT)        Medication Quantity Refills Start End   metFORMIN  MG Oral Tablet 24 Hr 180 tablet 0 2/2/2025 5/3/2025   Sig:   Take 1 tablet (750 mg total) by mouth 2 (two) times daily with meals.     Route:   Oral     E-Prescribing Status: Receipt confirmed by pharmacy (2/2/2025 10:12 PM CDT)        Medication Quantity Refills Start End   rosuvastatin (CRESTOR) 5 MG Oral Tab 90 tablet 3 2/2/2025 --   Sig:   Take 1 tablet (5 mg total) by mouth nightly.     Route:   Oral     E-Prescribing Status: Receipt confirmed by pharmacy (2/2/2025 10:12 PM CDT)

## 2025-05-01 NOTE — TELEPHONE ENCOUNTER
Patient's spouse calling for refills     Patient has an appointment scheduled with HAMZAH Morel for medication review/refills 5/5, 2:10pm    metFORMIN  MG Oral Tablet 24 Hr  - patient is out of the medication; last dose ~ 4/17/25    losartan 100 MG Oral Tab - patient is out of the medication;  last dose ~ 4/17/25    rosuvastatin (CRESTOR) 5 MG Oral Tab - patient has one pill left.     [id number] - [place], [place] - [address] AT [facility] [address] [place] [address], [phone], [phone] [[id number]]

## 2025-05-01 NOTE — TELEPHONE ENCOUNTER
Outpatient Medication Detail     Disp Refills Start End    rosuvastatin (CRESTOR) 5 MG Oral Tab 90 tablet 3 2/2/2025 --    Sig - Route: Take 1 tablet (5 mg total) by mouth nightly. - Oral    Sent to pharmacy as: Rosuvastatin Calcium 5 MG Oral Tablet (Crestor)    E-Prescribing Status: Receipt confirmed by pharmacy (2/2/2025 10:12 PM CST)      Associated Diagnoses    Hypercholesterolemia        Pharmacy    [facility] #[id number] - [place] - [address]  AT [facility] [address] [place] [address], [phone], [phone]

## 2025-05-05 ENCOUNTER — OFFICE VISIT (OUTPATIENT)
Dept: INTERNAL MEDICINE CLINIC | Facility: CLINIC | Age: 45
End: 2025-05-05

## 2025-05-05 VITALS
DIASTOLIC BLOOD PRESSURE: 82 MMHG | SYSTOLIC BLOOD PRESSURE: 130 MMHG | OXYGEN SATURATION: 99 % | BODY MASS INDEX: 32.47 KG/M2 | TEMPERATURE: 98 F | HEIGHT: 70 IN | WEIGHT: 226.81 LBS | HEART RATE: 102 BPM

## 2025-05-05 DIAGNOSIS — E11.42 DIABETIC POLYNEUROPATHY ASSOCIATED WITH TYPE 2 DIABETES MELLITUS (HCC): ICD-10-CM

## 2025-05-05 DIAGNOSIS — I10 ESSENTIAL HYPERTENSION: Primary | ICD-10-CM

## 2025-05-05 DIAGNOSIS — D75.1 POLYCYTHEMIA: ICD-10-CM

## 2025-05-05 DIAGNOSIS — M10.9 ACUTE GOUT INVOLVING TOE OF RIGHT FOOT, UNSPECIFIED CAUSE: ICD-10-CM

## 2025-05-05 DIAGNOSIS — E78.00 HYPERCHOLESTEROLEMIA: ICD-10-CM

## 2025-05-05 DIAGNOSIS — E11.65 UNCONTROLLED TYPE 2 DIABETES MELLITUS WITH HYPERGLYCEMIA (HCC): ICD-10-CM

## 2025-05-05 PROCEDURE — 99214 OFFICE O/P EST MOD 30 MIN: CPT | Performed by: NURSE PRACTITIONER

## 2025-05-05 RX ORDER — COLCHICINE 0.6 MG/1
0.6 TABLET ORAL DAILY
Qty: 30 TABLET | Refills: 0 | Status: SHIPPED | OUTPATIENT
Start: 2025-05-05 | End: 2025-06-04

## 2025-05-05 RX ORDER — LOSARTAN POTASSIUM 100 MG/1
100 TABLET ORAL DAILY
Qty: 90 TABLET | Refills: 3 | Status: SHIPPED | OUTPATIENT
Start: 2025-05-05

## 2025-05-05 RX ORDER — ROSUVASTATIN CALCIUM 5 MG/1
5 TABLET, COATED ORAL NIGHTLY
Qty: 90 TABLET | Refills: 3 | Status: SHIPPED | OUTPATIENT
Start: 2025-05-05

## 2025-05-05 RX ORDER — METFORMIN HYDROCHLORIDE 750 MG/1
750 TABLET, EXTENDED RELEASE ORAL 2 TIMES DAILY WITH MEALS
Qty: 180 TABLET | Refills: 0 | Status: SHIPPED | OUTPATIENT
Start: 2025-05-05 | End: 2025-08-03

## 2025-05-05 RX ORDER — DULOXETIN HYDROCHLORIDE 30 MG/1
30 CAPSULE, DELAYED RELEASE ORAL 2 TIMES DAILY
Qty: 180 CAPSULE | Refills: 3 | Status: SHIPPED | OUTPATIENT
Start: 2025-05-05

## 2025-05-05 NOTE — PATIENT INSTRUCTIONS
Colchicine 1 tab daily during gout flare - if you need this for more than 5-6 days, please reach out to the office

## 2025-05-05 NOTE — PROGRESS NOTES
Subjective:   Eddie Fonseca is a 44 year old male with PMH HTN, T2DM, HL who presents for Medication Follow-Up     Was seen by his primary (Dr. St) in Jan and Feb of this year. Had full labs in Feb.  Requested refills of medications 3 weeks ago, and was told that he needed appt to get meds refilled. Has been out of atorvastatin, metformin, and losartan for 2-3 weeks. He states his insurance company told him he needed a visit.  Chart review looks like it was requested that he come for BP follow up prior to refills being provided?  Additionally, he notes he is having a gout flare R great toe - hasn't been eating more meat or drinking etoh lately - states his gout flares are much less frequent than before he started taking medication regularly for T2DM, HTN, HL.    The only med he has is duloxetine    He is currently trying to find another eye doctor because the place he was referred to doesn't take her insurance.    Went to have repeat CBC drawn at [facility] (due to elevated Hgb in Feb) and was told there were no orders available.    History/Other:    Chief Complaint Reviewed and Verified  No Further Nursing Notes to   Review  Tobacco Reviewed  Allergies Reviewed  Medications Reviewed    Problem List Reviewed  Medical History Reviewed  Surgical History   Reviewed  Family History Reviewed  Social History Reviewed         Tobacco:  He has never smoked tobacco.    Current Medications[1]      Review of Systems:  Review of Systems  10 point review of systems otherwise negative with the exception of HPI and assessment and plan.    Objective:   /82   Pulse 102   Temp 97.8 °F (36.6 °C) (Temporal)   Ht 5' 10\" (1.778 m)   Wt 226 lb 12.8 oz (102.9 kg)   SpO2 99%   BMI 32.54 kg/m²  Estimated body mass index is 32.54 kg/m² as calculated from the following:    Height as of this encounter: 5' 10\" (1.778 m).    Weight as of this encounter: 226 lb 12.8 oz (102.9 kg).      Physical Exam  Vitals reviewed.    Cardiovascular:      Rate and Rhythm: Normal rate and regular rhythm.      Heart sounds: Normal heart sounds. No murmur heard.  Pulmonary:      Effort: Pulmonary effort is normal.      Breath sounds: Normal breath sounds.   Musculoskeletal:      Right lower leg: No edema.      Left lower leg: No edema.   Feet:      Comments: Erythema, warmth, tenderness to R 1st metatarsopharyngal joint  Skin:     General: Skin is warm and dry.   Neurological:      Mental Status: He is alert.         Assessment & Plan:   1. Essential hypertension (Primary)  -     Losartan Potassium; Take 1 tablet (100 mg total) by mouth daily.  Dispense: 90 tablet; Refill: 3  - BP within goal in office today. Medication refilled. F/u in 3 mo for BP check, no added salt to diet.  2. Uncontrolled type 2 diabetes mellitus with hyperglycemia (HCC)  -     metFORMIN HCl ER; Take 1 tablet (750 mg total) by mouth 2 (two) times daily with meals.  Dispense: 180 tablet; Refill: 0  -     A1C due but he has been off medication for 2-3 weeks, will not check today b/c won't be an accurate picture of diabetes control. Avoid added sugar in diet. Follow up in 3 mo for A1C.  3. Hypercholesterolemia  -     Rosuvastatin Calcium; Take 1 tablet (5 mg total) by mouth nightly.  Dispense: 90 tablet; Refill: 3  - Medication refilled. Increase physical activity and avoid added sugar/simple carbs in diet.  4. Diabetic polyneuropathy associated with type 2 diabetes mellitus (HCC)  -     DULoxetine HCl; Take 1 capsule (30 mg total) by mouth 2 (two) times daily.  Dispense: 180 capsule; Refill: 3  - As above  5. Polycythemia  -     CBC, Platelet; No Differential  - Order re-entered as [facility] didn't have the previous order - printed for patient to present to them if needed.  6. Acute gout involving toe of right foot, unspecified cause  -     Colchicine; Take 1 tablet (0.6 mg total) by mouth daily for 30 doses.  Dispense: 30 tablet; Refill: 0  - PE c/w gout flare R great toe. Low  purine diet discussed. Colchicine as directed - shouldn't have to take this for more than 5-6 days - if flare persists reach out to the office.    Check with insurance carrier on who will provide coverage for diabetes dilated eye exam.        Return in about 3 months (around 8/5/2025) for blood pressure and A1C check with Dr. St.    LIZZY Paulino, 5/5/2025, 2:33 PM     This note was prepared using Dragon Medical voice recognition dictation software. As a result errors may occur. When identified, these errors have been corrected. While every attempt is made to correct errors during dictation discrepancies may still exist.       [1]   Current Outpatient Medications   Medication Sig Dispense Refill    DULoxetine 30 MG Oral Cap DR Particles Take 1 capsule (30 mg total) by mouth 2 (two) times daily. 180 capsule 3    losartan 100 MG Oral Tab Take 1 tablet (100 mg total) by mouth daily. 90 tablet 3    metFORMIN  MG Oral Tablet 24 Hr Take 1 tablet (750 mg total) by mouth 2 (two) times daily with meals. 180 tablet 0    rosuvastatin (CRESTOR) 5 MG Oral Tab Take 1 tablet (5 mg total) by mouth nightly. 90 tablet 3    colchicine 0.6 MG Oral Tab Take 1 tablet (0.6 mg total) by mouth daily for 30 doses. 30 tablet 0    polyethylene glycol, PEG 3350-KCl-NaBcb-NaCl-NaSulf, 236 g Oral Recon Soln Take 4,000 mL by mouth As Directed. Take 2,000 mL the night before your procedure and 2,000 mL the morning of your procedure. (Patient not taking: Reported on 5/5/2025) 1 each 0

## 2025-05-07 ENCOUNTER — TELEPHONE (OUTPATIENT)
Dept: FAMILY MEDICINE CLINIC | Facility: CLINIC | Age: 45
End: 2025-05-07

## 2025-05-07 NOTE — TELEPHONE ENCOUNTER
Patient's spouse called (not on Release of Information), verified patient's Name and . [facility] patient was seen in the office a few days ago. He was prescribed medications, however, they are still unable to  the refill from the pharmacy.     Called [facility] in [place], verified patient's Name and . [facility] patient's prescriptions were transferred to [facility] in [place].    Attempted to call spouse and patient, left detailed message (okay per Release of Information) and asked to call back if with any questions or concerns.

## 2025-05-20 ENCOUNTER — TELEPHONE (OUTPATIENT)
Facility: CLINIC | Age: 45
End: 2025-05-20

## 2025-05-20 NOTE — TELEPHONE ENCOUNTER
I called the patient to discuss biopsy results, he did not answer.    Voicemail was left.    Patient has ulcerative colitis with cecal patch -- will likely start oral/topical mesalamine vs skyrizi/tremfya based on patient preference.

## 2025-06-03 DIAGNOSIS — M10.9 ACUTE GOUT INVOLVING TOE OF RIGHT FOOT, UNSPECIFIED CAUSE: ICD-10-CM

## 2025-06-03 RX ORDER — COLCHICINE 0.6 MG/1
0.6 TABLET ORAL DAILY
Qty: 30 TABLET | Refills: 0 | Status: SHIPPED | OUTPATIENT
Start: 2025-06-03

## 2025-07-06 DIAGNOSIS — M10.9 ACUTE GOUT INVOLVING TOE OF RIGHT FOOT, UNSPECIFIED CAUSE: ICD-10-CM

## 2025-07-08 RX ORDER — COLCHICINE 0.6 MG/1
0.6 TABLET ORAL DAILY
Qty: 30 TABLET | Refills: 0 | Status: SHIPPED | OUTPATIENT
Start: 2025-07-08

## 2025-07-08 NOTE — TELEPHONE ENCOUNTER
Please Review. Protocol Failed; No Protocol     Requested Prescriptions   Pending Prescriptions Disp Refills    COLCHICINE 0.6 MG Oral Tab [Pharmacy Med Name: COLCHICINE 0.6 MG TABLET] 30 tablet 0     Sig: TAKE 1 TABLET BY MOUTH EVERY DAY       There is no refill protocol information for this order

## 2025-08-02 DIAGNOSIS — E11.65 UNCONTROLLED TYPE 2 DIABETES MELLITUS WITH HYPERGLYCEMIA (HCC): ICD-10-CM

## 2025-08-05 DIAGNOSIS — M10.9 ACUTE GOUT INVOLVING TOE OF RIGHT FOOT, UNSPECIFIED CAUSE: ICD-10-CM

## 2025-08-05 RX ORDER — METFORMIN HYDROCHLORIDE 750 MG/1
750 TABLET, EXTENDED RELEASE ORAL 2 TIMES DAILY WITH MEALS
Qty: 60 TABLET | Refills: 0 | Status: SHIPPED | OUTPATIENT
Start: 2025-08-05

## 2025-08-06 RX ORDER — COLCHICINE 0.6 MG/1
0.6 TABLET ORAL DAILY
Qty: 90 TABLET | Refills: 3 | OUTPATIENT
Start: 2025-08-06

## (undated) NOTE — LETTER
06/26/19        [address]      Dear Maciej Medicus,    [address] [facility] records indicate that you have outstanding lab work and or testing that was ordered for you and has not yet been completed:  Orders Placed This Encounter      Bas

## (undated) NOTE — LETTER
Date & Time: 3/8/2021, 7:10 PM  Patient: Justo Hoffman  Encounter Provider(s):    LIZZY Cristina       To Whom It May Concern:    Doug Annettens was seen and treated in our department on 3/8/2021. He should not return to work until 3/9/21.     If you

## (undated) NOTE — LETTER
8/25/2020             [employer]: [employer] [place]        [id number] [facility] [address]         To whom it may concern    Mr. Dionna Cortes appears to be in good health and may return to work as long as he is symptom-free for at least 24 hours.   He

## (undated) NOTE — LETTER
8/31/2020              [address] [facility] [address]         Dear Mainor Lira,    [address] [facility] records indicate that the tests ordered for you by Zoran Vick MD  have not been done.   If you have, in fact, already completed the

## (undated) NOTE — LETTER
7/20/2021          To Whom It May Concern:    Devin Conklin is currently under my medical care. Please excuse the patient from work missed as the patient has been ill with gout. May return to work on Friday, 7/23/21 if sufficiently recovered.   If you req

## (undated) NOTE — LETTER
8/31/2020              [address] [facility] [address]         Dear Aubree Magdaleno,    [address] [facility] records indicate that the tests ordered for you by Franco Peacock MD  have not been done.   If you have, in fact, already completed the

## (undated) NOTE — LETTER
7/30/2020          To Whom It May Concern:    Angi Prather is currently under my medical care and may not return to work at this time. Please excuse Renita Link for 4 days. He may return to work on 8/3/2020. Activity is restricted as follows: none.     If

## (undated) NOTE — LETTER
4/2/2025          Eddie Fonseca    [address] [facility] [address]         Dear Eddie,    Our records indicate that the tests ordered for you by LIZZY Dodge  have not been done.  If you have, in fact, already completed the tests or you do not wish to have the tests done, please contact our office at THE NUMBER LISTED BELOW.  Otherwise, please proceed with the testing.  Enclosed is a duplicate order for your convenience.    Labs ordered:   Calprotectin, Fecal (Order #[id number]) on 2/7/25      Giardia + Crypto Antigen, Stool (Order #[id number]) on 2/7/25      Stool Culture W/Shigatoxin (Order #[id number]) on 2/7/25      C. diff toxigenic PCR (OPT) (Order #[id number]) on 2/7/25      H. Pylori Stool Ag, EIA (Order #[id number]) on 2/7/25       To schedule a test at any [facility], call Central Scheduling at [phone], Monday through Friday between 7:30am to 6pm and on Saturday between 8am and 1pm.   Evening and weekend appointments for your exam are available.   Please call [facility] labs at [phone] to schedule this test order.  Please call [facility] at [phone] to schedule your Imaging order.     Sincerely,    LIZZY Dodge  [facility]  [address]  [facility] [place] [address] [phone]

## (undated) NOTE — LETTER
5/27/2021          To Whom It May Concern:    Mare Dowling is currently under my medical care and may not return to work at this time. Please excuse Angel Luis Mora for 4 days. He may return to work on Monday May 31th, 2021.    Activity is restricted as follows

## (undated) NOTE — LETTER
5/25/2021          To Whom It May Concern:    Angi Prather is currently under my medical care and may not return to work at this time. Please excuse Renita Link for 3 days. He may return to work on Friday May 28th, 2021.    Activity is restricted as follows

## (undated) NOTE — LETTER
5/27/2021          To Whom It May Concern:    Juan Francisco Yoon is currently under my medical care and may not return to work at this time. Please excuse Jazmyn Shen for 4 days. He may return to work on Monday May 30th, 2021.    Activity is restricted as follows

## (undated) NOTE — LETTER
03/26/19        [address]      Dear Rashad Anguiano,    [address] [facility] records indicate that you have outstanding lab work and or testing that was ordered for you and has not yet been completed:  Orders Placed This Encounter      Bas